# Patient Record
Sex: MALE | Race: WHITE | NOT HISPANIC OR LATINO | URBAN - METROPOLITAN AREA
[De-identification: names, ages, dates, MRNs, and addresses within clinical notes are randomized per-mention and may not be internally consistent; named-entity substitution may affect disease eponyms.]

---

## 2024-02-06 ENCOUNTER — OFFICE VISIT (OUTPATIENT)
Dept: ORTHOPEDIC SURGERY | Facility: CLINIC | Age: 79
End: 2024-02-06
Payer: MEDICARE

## 2024-02-06 ENCOUNTER — APPOINTMENT (OUTPATIENT)
Dept: RADIOLOGY | Facility: CLINIC | Age: 79
End: 2024-02-06
Payer: MEDICARE

## 2024-02-06 ENCOUNTER — HOSPITAL ENCOUNTER (OUTPATIENT)
Dept: RADIOLOGY | Facility: CLINIC | Age: 79
Discharge: HOME | End: 2024-02-06
Payer: MEDICARE

## 2024-02-06 VITALS — HEIGHT: 76 IN | WEIGHT: 260.8 LBS | BODY MASS INDEX: 31.76 KG/M2

## 2024-02-06 DIAGNOSIS — M25.561 ACUTE BILATERAL KNEE PAIN: ICD-10-CM

## 2024-02-06 DIAGNOSIS — M25.562 ACUTE BILATERAL KNEE PAIN: ICD-10-CM

## 2024-02-06 DIAGNOSIS — M17.0 PRIMARY OSTEOARTHRITIS OF BOTH KNEES: Primary | ICD-10-CM

## 2024-02-06 PROCEDURE — 20610 DRAIN/INJ JOINT/BURSA W/O US: CPT | Performed by: ORTHOPAEDIC SURGERY

## 2024-02-06 PROCEDURE — 99214 OFFICE O/P EST MOD 30 MIN: CPT | Performed by: ORTHOPAEDIC SURGERY

## 2024-02-06 PROCEDURE — 1036F TOBACCO NON-USER: CPT | Performed by: ORTHOPAEDIC SURGERY

## 2024-02-06 PROCEDURE — 2500000004 HC RX 250 GENERAL PHARMACY W/ HCPCS (ALT 636 FOR OP/ED): Performed by: ORTHOPAEDIC SURGERY

## 2024-02-06 PROCEDURE — 2500000005 HC RX 250 GENERAL PHARMACY W/O HCPCS: Performed by: ORTHOPAEDIC SURGERY

## 2024-02-06 PROCEDURE — 1125F AMNT PAIN NOTED PAIN PRSNT: CPT | Performed by: ORTHOPAEDIC SURGERY

## 2024-02-06 PROCEDURE — 1159F MED LIST DOCD IN RCRD: CPT | Performed by: ORTHOPAEDIC SURGERY

## 2024-02-06 PROCEDURE — 73560 X-RAY EXAM OF KNEE 1 OR 2: CPT | Mod: 50

## 2024-02-06 RX ORDER — LIDOCAINE HYDROCHLORIDE 20 MG/ML
2 INJECTION, SOLUTION INFILTRATION; PERINEURAL
Status: COMPLETED | OUTPATIENT
Start: 2024-02-06 | End: 2024-02-06

## 2024-02-06 RX ORDER — METHYLPREDNISOLONE ACETATE 40 MG/ML
40 INJECTION, SUSPENSION INTRA-ARTICULAR; INTRALESIONAL; INTRAMUSCULAR; SOFT TISSUE
Status: COMPLETED | OUTPATIENT
Start: 2024-02-06 | End: 2024-02-06

## 2024-02-06 RX ADMIN — METHYLPREDNISOLONE ACETATE 40 MG: 40 INJECTION, SUSPENSION INTRALESIONAL; INTRAMUSCULAR; INTRASYNOVIAL; SOFT TISSUE at 17:53

## 2024-02-06 RX ADMIN — LIDOCAINE HYDROCHLORIDE 2 ML: 20 INJECTION, SOLUTION INFILTRATION; PERINEURAL at 17:53

## 2024-02-06 ASSESSMENT — PAIN DESCRIPTION - DESCRIPTORS: DESCRIPTORS: SHARP

## 2024-02-06 ASSESSMENT — PAIN SCALES - GENERAL: PAINLEVEL_OUTOF10: 5 - MODERATE PAIN

## 2024-02-06 ASSESSMENT — PAIN - FUNCTIONAL ASSESSMENT: PAIN_FUNCTIONAL_ASSESSMENT: 0-10

## 2024-02-06 NOTE — PROGRESS NOTES
Patient presents with bilateral knee arthritis was injected several years ago on the right knee and it worked very well he is developed similar pain in the left knee which is worse now has not had treatment for this  Pain is with activity  No fevers or chills no history of injury   Past medical,family and social histories have been reviewed and are up to date.    All other body systems have been reviewed and are negative for complaint.    Constitutional: Well-developed well-nourished   Eyes: Sclerae anicteric, pupils equal and round  HENT: Normocephalic atraumatic  Cardiovascular: Pulses full, regular rate and rhythm  Respiratory: Breathing not labored, no wheezing  Integumentary: Skin intact, no lesions or rashes  Neurological: Sensation intact, no gross strength deficits, reflexes equal  Psychiatric: Alert oriented and appropriate  Hematologic/lymphatic: No lymphadenopathy  Both knees: Varus deformity slight flexion contractures maximally tender medially no effusions  X-rays show advanced arthritis primarily affecting medial compartment worse radiographically on the right than the left  Assessment bilateral knee arthritis  Discussed natural history of arthritis discussed conservative measures including weight loss exercise  Recommended injections  Discussed indications for knee replacement surgery   L Inj/Asp: bilateral knee on 2/6/2024 5:53 PM  Indications: pain  Details: 21 G needle, lateral approach  Medications (Right): 2 mL lidocaine 20 mg/mL (2 %); 40 mg methylPREDNISolone acetate 40 mg/mL  Medications (Left): 2 mL lidocaine 20 mg/mL (2 %); 40 mg methylPREDNISolone acetate 40 mg/mL  Procedure, treatment alternatives, risks and benefits explained, specific risks discussed. Consent was given by the patient. Immediately prior to procedure a time out was called to verify the correct patient, procedure, equipment, support staff and site/side marked as required. Patient was prepped and draped in the usual sterile  fashion.         Djd both knee left hurts more than right    Injected both  Discussed tka

## 2025-02-18 ENCOUNTER — OFFICE VISIT (OUTPATIENT)
Dept: ORTHOPEDIC SURGERY | Facility: CLINIC | Age: 80
End: 2025-02-18
Payer: MEDICARE

## 2025-02-18 ENCOUNTER — HOSPITAL ENCOUNTER (OUTPATIENT)
Dept: RADIOLOGY | Facility: CLINIC | Age: 80
Discharge: HOME | End: 2025-02-18
Payer: MEDICARE

## 2025-02-18 VITALS — WEIGHT: 256 LBS | HEIGHT: 76 IN | BODY MASS INDEX: 31.17 KG/M2

## 2025-02-18 DIAGNOSIS — M17.0 PRIMARY OSTEOARTHRITIS OF BOTH KNEES: ICD-10-CM

## 2025-02-18 DIAGNOSIS — G89.29 CHRONIC PAIN OF BOTH KNEES: Primary | ICD-10-CM

## 2025-02-18 DIAGNOSIS — M25.561 CHRONIC PAIN OF BOTH KNEES: Primary | ICD-10-CM

## 2025-02-18 DIAGNOSIS — Z01.818 PREOP EXAMINATION: ICD-10-CM

## 2025-02-18 DIAGNOSIS — M25.562 CHRONIC PAIN OF BOTH KNEES: Primary | ICD-10-CM

## 2025-02-18 PROCEDURE — 99214 OFFICE O/P EST MOD 30 MIN: CPT | Mod: 25,57 | Performed by: ORTHOPAEDIC SURGERY

## 2025-02-18 PROCEDURE — 1160F RVW MEDS BY RX/DR IN RCRD: CPT | Performed by: ORTHOPAEDIC SURGERY

## 2025-02-18 PROCEDURE — 1159F MED LIST DOCD IN RCRD: CPT | Performed by: ORTHOPAEDIC SURGERY

## 2025-02-18 PROCEDURE — 99214 OFFICE O/P EST MOD 30 MIN: CPT | Performed by: ORTHOPAEDIC SURGERY

## 2025-02-18 PROCEDURE — 73562 X-RAY EXAM OF KNEE 3: CPT | Mod: 50

## 2025-02-18 PROCEDURE — 2500000004 HC RX 250 GENERAL PHARMACY W/ HCPCS (ALT 636 FOR OP/ED): Performed by: ORTHOPAEDIC SURGERY

## 2025-02-18 PROCEDURE — 1036F TOBACCO NON-USER: CPT | Performed by: ORTHOPAEDIC SURGERY

## 2025-02-18 PROCEDURE — 20610 DRAIN/INJ JOINT/BURSA W/O US: CPT | Mod: LT | Performed by: ORTHOPAEDIC SURGERY

## 2025-02-18 RX ORDER — METHYLPREDNISOLONE ACETATE 40 MG/ML
40 INJECTION, SUSPENSION INTRA-ARTICULAR; INTRALESIONAL; INTRAMUSCULAR; SOFT TISSUE
Status: COMPLETED | OUTPATIENT
Start: 2025-02-18 | End: 2025-02-18

## 2025-02-18 RX ORDER — MELOXICAM 7.5 MG/1
7.5 TABLET ORAL ONCE
OUTPATIENT
Start: 2025-02-18 | End: 2025-02-18

## 2025-02-18 RX ORDER — SCOPOLAMINE 1 MG/3D
1 PATCH, EXTENDED RELEASE TRANSDERMAL
OUTPATIENT
Start: 2025-02-18 | End: 2025-02-21

## 2025-02-18 RX ORDER — LIDOCAINE HYDROCHLORIDE 20 MG/ML
2 INJECTION, SOLUTION INFILTRATION; PERINEURAL
Status: COMPLETED | OUTPATIENT
Start: 2025-02-18 | End: 2025-02-18

## 2025-02-18 RX ORDER — ACETAMINOPHEN 325 MG/1
975 TABLET ORAL ONCE
OUTPATIENT
Start: 2025-02-18 | End: 2025-02-18

## 2025-02-18 RX ORDER — PREGABALIN 25 MG/1
75 CAPSULE ORAL ONCE
OUTPATIENT
Start: 2025-02-18 | End: 2025-02-18

## 2025-02-18 RX ADMIN — LIDOCAINE HYDROCHLORIDE 2 ML: 20 INJECTION, SOLUTION INFILTRATION; PERINEURAL at 16:01

## 2025-02-18 RX ADMIN — METHYLPREDNISOLONE ACETATE 40 MG: 40 INJECTION, SUSPENSION INTRALESIONAL; INTRAMUSCULAR; INTRASYNOVIAL; SOFT TISSUE at 16:01

## 2025-02-18 NOTE — PROGRESS NOTES
Chief Complaint   Chief Complaint   Patient presents with    Right Knee - Pain     Dx surgery    Left Knee - Pain         HPI:      Colby Hunter is a pleasant 79 y.o. year-old male who is seen today for bilateral knee pain right worse than left last injection of the right knee was not helpful the left to become more painful recently he is unable to walk more than 1 block without stopping his treatment has included attempts at weight loss injections and oral anti-inflammatories and a combination of medications    Review of Systems all other body systems have been reviewed and are negative for complaint.    There were no vitals filed for this visit.    History reviewed. No pertinent past medical history.  There is no problem list on file for this patient.      Medication Documentation Review Audit       Reviewed by Crys Mccain MA (Medical Assistant) on 02/18/25 at 1529      Medication Order Taking? Sig Documenting Provider Last Dose Status            No Medications to Display                                   No Known Allergies    Social History     Socioeconomic History    Marital status:      Spouse name: Not on file    Number of children: Not on file    Years of education: Not on file    Highest education level: Not on file   Occupational History    Not on file   Tobacco Use    Smoking status: Never    Smokeless tobacco: Never   Substance and Sexual Activity    Alcohol use: Yes     Alcohol/week: 3.0 standard drinks of alcohol     Types: 1 Glasses of wine, 1 Cans of beer, 1 Shots of liquor per week    Drug use: Never    Sexual activity: Not on file   Other Topics Concern    Not on file   Social History Narrative    Not on file     Social Drivers of Health     Financial Resource Strain: Not on file   Food Insecurity: Not on file   Transportation Needs: Not on file   Physical Activity: Not on file   Stress: Not on file   Social Connections: Not on file   Intimate Partner Violence: Not on file   Housing  Stability: Not on file       History reviewed. No pertinent surgical history.    Body mass index is 31.16 kg/m².    HgA1c:   Lab Results   Component Value Date    HGBA1C 5.1 08/21/2024    [id number] 100 08/21/2024       Physical Exam:  Constitutional: Well-developed well-nourished   Eyes: Sclerae anicteric, pupils equal and round  HENT: Normocephalic atraumatic  Cardiovascular: Pulses full, regular rate and rhythm  Respiratory: Breathing not labored, no wheezing  Integumentary: Skin intact, no lesions or rashes  Neurological: Sensation intact, no gross strength deficits, reflexes equal  Psychiatric: Alert oriented and appropriate  Hematologic/lymphatic: No lymphadenopathy  Right knee: Varus deformity maximally tender medially slight flexion contracture no effusion generalized joint line tenderness and crepitus range of motion 5-125  Left knee slight varus deformity maximally tender medially small effusion range of motion preserved          Imaging:  X-rays are mislabeled but the right one shows advanced arthritis bone-on-bone KL stage IV left knee shows medial joint space narrowing varus deformity        Impression/Plan:  End-stage right knee arthritis  This patient has failed conservative therapy for knee arthritis.  This has included activity modification, attempts at weight loss, antiinflammatory medication, injectables, and physical therapy.  Pain is severely affecting activities of daily living and present at rest as well.    I have recommended proceeding with total knee replacement.  We discussed the potential risks including but not limited to persistent pain, DVT, infection, stiffness, periprosthetic fracture, and loosening.  Additionally we have discussed potential prolonged rehabilitation of several months.  We have also discussed preoperative medical screening and joint class.  The plan is for the procedure to be done as an outpatient.  The patient has been instructed that they need to make appropriate  preparations for assistance at home in the immediate postoperative period  Moderately advanced left knee arthritis injected knee today  L Inj/Asp: L knee on 2/18/2025 4:01 PM  Indications: pain  Details: 21 G needle, anteromedial approach  Medications: 40 mg methylPREDNISolone acetate 40 mg/mL; 2 mL lidocaine 20 mg/mL (2 %)

## 2025-02-20 ENCOUNTER — TELEPHONE (OUTPATIENT)
Dept: ORTHOPEDIC SURGERY | Facility: CLINIC | Age: 80
End: 2025-02-20
Payer: MEDICARE

## 2025-02-20 DIAGNOSIS — M17.0 PRIMARY OSTEOARTHRITIS OF BOTH KNEES: Primary | ICD-10-CM

## 2025-04-17 ENCOUNTER — APPOINTMENT (OUTPATIENT)
Dept: ORTHOPEDIC SURGERY | Facility: CLINIC | Age: 80
End: 2025-04-17
Payer: MEDICARE

## 2025-05-19 ENCOUNTER — CLINICAL SUPPORT (OUTPATIENT)
Dept: PREADMISSION TESTING | Facility: HOSPITAL | Age: 80
End: 2025-05-19
Payer: MEDICARE

## 2025-05-19 DIAGNOSIS — M17.0 PRIMARY OSTEOARTHRITIS OF BOTH KNEES: ICD-10-CM

## 2025-05-19 RX ORDER — GLUCOSAMINE/CHONDR SU A SOD 167-133 MG
250 CAPSULE ORAL
COMMUNITY
End: 2025-06-02 | Stop reason: WASHOUT

## 2025-05-19 RX ORDER — GABAPENTIN 100 MG/1
100 CAPSULE ORAL 3 TIMES DAILY
COMMUNITY
Start: 2025-02-24 | End: 2025-06-02

## 2025-05-19 RX ORDER — FLUTICASONE PROPIONATE 50 MCG
1 SPRAY, SUSPENSION (ML) NASAL DAILY
COMMUNITY
Start: 2024-11-04

## 2025-05-19 RX ORDER — UBIDECARENONE 30 MG
30 CAPSULE ORAL
COMMUNITY

## 2025-05-19 RX ORDER — ATORVASTATIN CALCIUM 20 MG/1
20 TABLET, FILM COATED ORAL
COMMUNITY
Start: 2025-03-04

## 2025-05-19 RX ORDER — ENALAPRIL MALEATE 20 MG/1
20 TABLET ORAL 2 TIMES DAILY
COMMUNITY
Start: 2025-03-04

## 2025-05-19 RX ORDER — SPIRONOLACTONE AND HYDROCHLOROTHIAZIDE 25; 25 MG/1; MG/1
0.5 TABLET ORAL
COMMUNITY
Start: 2025-03-04

## 2025-05-19 RX ORDER — ERGOCALCIFEROL (VITAMIN D2) 50 MCG
2000 CAPSULE ORAL DAILY
COMMUNITY

## 2025-05-19 RX ORDER — DOXYCYCLINE HYCLATE 20 MG
20 TABLET ORAL 2 TIMES DAILY
COMMUNITY
End: 2025-06-02 | Stop reason: WASHOUT

## 2025-05-19 RX ORDER — THIAMINE HCL 50 MG
50 TABLET ORAL DAILY
COMMUNITY
End: 2025-06-02 | Stop reason: WASHOUT

## 2025-05-19 RX ORDER — TAMSULOSIN HYDROCHLORIDE 0.4 MG/1
0.4 CAPSULE ORAL DAILY
COMMUNITY

## 2025-05-19 RX ORDER — NAPROXEN SODIUM 220 MG/1
1 TABLET ORAL DAILY
COMMUNITY

## 2025-05-19 RX ORDER — DIGOXIN 250 MCG
250 TABLET ORAL DAILY
COMMUNITY
End: 2025-06-02 | Stop reason: WASHOUT

## 2025-05-19 RX ORDER — METOPROLOL SUCCINATE 100 MG/1
50 TABLET, EXTENDED RELEASE ORAL
COMMUNITY
Start: 2024-02-26

## 2025-05-19 RX ORDER — MULTIVIT-MIN/IRON/FOLIC ACID/K 18-600-40
CAPSULE ORAL DAILY
COMMUNITY

## 2025-05-19 RX ORDER — AMLODIPINE BESYLATE 10 MG/1
10 TABLET ORAL
COMMUNITY
Start: 2025-03-04

## 2025-05-22 ENCOUNTER — EDUCATION (OUTPATIENT)
Dept: ORTHOPEDIC SURGERY | Facility: CLINIC | Age: 80
End: 2025-05-22
Payer: MEDICARE

## 2025-05-22 NOTE — GROUP NOTE
In addition to the group class activities, Colby Hunter had the following lab work completed:  No orders of the defined types were placed in this encounter.    Colby Hunter  attended joint class on 5/22 and Brought a care partner to the class. The preop survey was completed and the patient provided attestation that they understand the content reviewed and that they do have a care partner identified to assist with recovery. Patient was provided with a folder of materials and instructed to call orthopedic navigator with questions.   A new History and Physical was not completed.    This class lasted approximately 2 hours and had 14 participants.   Thank you for attending our Joint Replacement class today in preparation for your upcoming surgery.  Topics discussed include:    MyChart Enrollment  Communication with Care Team  My Chart is the best form of communication to reach all of your caregivers  You can send messages to specific care givers, or a care team  Continued Education  You will be enrolled in a Total Joint Replacement care plan to receive additional education before and after surgery  You can review a short recording of the class content  Access to Medical Records  You can access test results, office notes, appointments, etc.  You can connect to other healthcare systems who use [employer] ([facility], [facility], [facility], etc.)  [id number]  Program Information  Using Meds to Beds is our standard process for joint replacements at [facility] to minimize issues with homegoing medications. Please let us know ahead of time if there is a reason why Meds to Beds cannot be used    Background/Understanding of Joint Replacement Surgery  Potential for same day discharge  Any questions or concerns about your specific surgery/plan are to be directed to the surgeon's office    How to Prepare for Surgery  Use of Nicotine Products/Smoking  Stop several weeks before surgery  Such products slow down the healing process and increase  risk of post-op infection and complications  Clearance for Surgery  Medical Clearance by Specialists  Dental Clearance  Cracked/Broken/Loose teeth left untreated may postpone surgery  The importance of post-op antibiotics for dental visits per surgeon protocol  Preadmission Testing  **Potential for postponed surgery if appropriate clearance is not obtained  Medication Instruction  Follow instructions provided by the doctor who prescribes your medication (typically, but not limited to cardiologist)  Preadmission testing will provide additional instructions during your appointment on what to stop and what to take as you get closer to surgery  For clarification of these instructions, please call preadmission testing directly - [phone]  Tips for Preparing the home for discharge from the hospital  Care Partner  Requirement for surgery, the patient must have a plan to have help at home  Potential for postponed surgery if plan for home support cannot be established  How the care partner can help after surgery  CHG Body Wash/Mouth Wash  Follow the instructions given at preadmission testing  Body wash is to be used on the body and hair for 5 washes  Mouthwash is to be used the night before and morning of surgery  **This is a system-wide protocol developed by infectious disease professionals, we will not alter our recommendations for those with sensitive skin or those who have special hair needs.  Please follow the instructions as they are written as this will provide the best infection prevention measures for surgery.  Should you have an allergy to one of the products, please discuss with your preadmission team**    What to Expect in the Hospital/At Home  Morning of Surgery NPO Guidelines  Nothing to eat after midnight  Water can be consumed up to 2 hours prior to arrival  Surgical and Post-Surgical Care Team  Surgical Team  Anesthesia Team  Nursing  Physical Therapy  Care Coordinating  Pharmacy  Hospital Arrival  Instructions  Arrive at the time provided to you  Consider traffic patterns (rush-hour) based on arrival time  Have arrangements made for a ride home  If discharging same day, care partner should remain at the hospital  Recovering after Surgery  Recovery Room - Visitors are not brought back  Transition to hospital room - 2nd Floor, Visitors will be directed to your room  The presence of and strategies for controlling surgical pain and swelling  The importance of early mobility  Side effects after surgery  What to expect if staying overnight    Discharge Planning  The intended plan for discharge will be for patients to discharge home  All patients require a care partner (family, friend, neighbor, etc.) to stay with the patient for the first few nights after surgery  The inability to secure help at home will postpone surgery  Home Care Services set up per surgeon order  Physical Therapy  Occupational Therapy  **If desired, private duty care can be arranged by the patient ahead of time**  Outpatient Physical Therapy per surgeon order    Recovering at Home  Wound Care  Follow wound care instructions found in your discharge paperwork  Bandage is water-resistant and you may shower with the bandage  Do not scrub directly over the bandage  Do not submerge in water until cleared (bathtub, hot tub, pool, etc.)    Post-Op Risk Prevention  Infection Prevention  Promptly seek treatment for any infections post-operatively  Routine dental visits must be postponed for 3 months after surgery  Your surgeon may require antibiotics prior to future dental visits  Any concerns for infection not related directly to the knee or the hip should be managed by your primary care provider  Blood Clots  Be sure to complete the course of blood thinning medication as prescribed by your surgeon  Movement every 1-2 hours during the day is encouraged to prevent blood clots  Monitor for signs of blood clots  Wear compression stockings as prescribed by  your surgeon  Constipation  Constipation is common following surgery  Drink plenty of fluids  Take stool softener/laxative as prescribed by your surgeon  Move around frequently  Eat foods high in fiber  Fall Prevention  Prepare home ahead of time to clear space to move with walker  Remove throw rugs and electrical cords from walkways  Install railings near any stairways with more than 2 steps  Use night lights for increased visibility at night  Continue to use your assistive device until cleared by surgeon or physical therapy  Dislocation Prevention - Not all procedures will have dislocation precautions  Follow dislocation precautions provided by your surgeon  It is OK to resume sexual activity about 6 weeks following surgery  Be sure to follow any dislocation precautions assigned    Durable Medical Equipment  Cold Therapy  Breg Cold Therapy Machines  Ice/Gel Packs  Assistive Devices  Folding Walker with Wheels (in the front only)  No Rollators  Crutches if approved by Physical Therapy and Surgeon after surgery  Hip Kits  Raised Toilet Seats  Additional Compression Stockings    Joint Preservation  Healthy Activities when Cleared  Walking  Swimming  Bike Riding  Activities to Avoid  Refrain from repetitive motions which have a high impact on the joint  Gradual Progression  Progress activity slowly, listen to your body  Common Findings - NORMAL after surgery  Clicking/Grinding  Numbness near incision    Physical Therapy  Prehabilitation exercises  START TODAY ON BOTH LEGS  Surgery Specific Precautions  Follow surgery specific precautions found in your discharge paperwork    Follow-Up Visit  All patients will see their surgeon for a follow up visit after surgery  The visit may range from 2-6 weeks after surgery and is surgeon specific      Please don't hesitate to reach out if you have any additional questions or concerns.    Rajiv Matt BSN, RN  Alice Rosa BSN, RN-BC  Orthopedic Nurses  [facility]    [phone]

## 2025-05-31 NOTE — CPM/PAT NURSE NOTE
CPM/PAT Nurse Note      Name: Colby Hunter (Colby Hunter)  /Age: 1945/79 y.o.       Medical History[1]    Surgical History[2]    Patient Sexual activity questions deferred to the physician.    Family History[3]    Allergies[4]    Prior to Admission medications    Medication Sig Start Date End Date Taking? Authorizing Provider   amLODIPine (Norvasc) 10 mg tablet Take 1 tablet (10 mg) by mouth once daily. 3/4/25   Historical Provider, MD   ascorbic acid, vitamin C, 500 mg capsule Take by mouth once daily.    Historical Provider, MD   atorvastatin (Lipitor) 20 mg tablet Take 1 tablet (20 mg) by mouth once daily. 3/4/25   Historical Provider, MD   co-enzyme Q-10 30 mg capsule Take 1 capsule (30 mg) by mouth once daily.    Historical Provider, MD   digoxin (Lanoxin) 250 MCG tab;et Take 1 tablet (250 mcg) by mouth once daily.    Historical Provider, MD   doxycycline (Periostat) 20 mg tablet Take 1 tablet (20 mg) by mouth 2 times a day.    Historical Provider, MD   enalapril (Vasotec) 20 mg tablet Take 1 tablet (20 mg) by mouth twice a day. 3/4/25   Historical Provider, MD   ergocalciferol (Vitamin D-2) 50 MCG (2000 UT) capsule capsule Take 1 capsule (50 mcg) by mouth once daily.    Historical Provider, MD   fluticasone (Flonase) 50 mcg/actuation nasal spray 1 spray by Does not apply route once daily. 24   Historical Provider, MD   gabapentin (Neurontin) 100 mg capsule Take 1 capsule (100 mg) by mouth 3 times a day. 25  Historical Provider, MD   metoprolol succinate XL (Toprol-XL) 100 mg 24 hr tablet Take 0.5 tablets (50 mg) by mouth once daily. 24   Historical Provider, MD   niacin 500 mg tablet Take 0.5 tablets (250 mg) by mouth once daily with breakfast.    Historical Provider, MD   omega 3-dha-epa-fish oil (Fish OiL) 1,200 (144-216) mg capsule Take 1 capsule (1,200 mg) by mouth once daily.    Historical Provider, MD   spironolacton-hydrochlorothiaz (Aldactazide) 25-25 mg tablet  Take 0.5 tablets (12.5 mg) by mouth once daily. 3/4/25   Historical Provider, MD   tamsulosin (Flomax) 0.4 mg 24 hr capsule Take 1 capsule (0.4 mg) by mouth once daily.    Historical Provider, MD   thiamine (Vitamin B-1) 50 mg tablet Take 1 tablet (50 mg) by mouth once daily.    Historical Provider, MD MARINA NOLAN     DASI Risk Score    No data to display       Caprini DVT Assessment    No data to display       Modified Frailty Index    No data to display       [id number] Stroke Risk Points  Current as of just now        5 0 to 9 Points:      Last Change:           The [id number]-[id number] risk score (Lip DANNY, et al. 2009. © 2010 American College [employer] Chest Physicians) quantifies the risk of stroke for a patient with atrial fibrillation. For patients without atrial fibrillation or under the age of 18 this score appears as N/A. Higher score values generally indicate higher risk of stroke.          Points Metrics   0 Has Congestive Heart Failure:  No     Patients with congestive heart failure get 1 point.    Current as of just now   1 Has Hypertension:  Yes      Patients with hypertension get 1 point.    Current as of just now   2 Age:  79     Patients 65 to 74 years old get 1 point, or patients 75 years and older get 2 points.    Current as of just now   0 Has Diabetes:  No     Patients with diabetes get 1 point.    Current as of just now   2 Had Stroke:  No  Had TIA:  Yes  Had Thromboembolism:  No     Patients who have had a stroke, TIA, or thromboembolism get 2 points.    Current as of just now   0 Has Vascular Disease:  No     Patients with vascular disease get 1 point.    Current as of just now   0 Clinically Relevant Sex:  Male     Patients with a clinically relevant sex of Female get 1 point.    Current as of just now             Revised Cardiac Risk Index    No data to display       Apfel Simplified Score    No data to display       Risk Analysis Index Results This Encounter    No data found in the last 10  encounters.       Prodigy: High Risk  Total Score: 20              Prodigy Age Score      Prodigy Gender Score          ARISCAT Score for Postoperative Pulmonary Complications    No data to display       Yelitza Perioperative Risk for Myocardial Infarction or Cardiac Arrest (FARHANA)    No data to display         Nurse Plan of Action: RN screening call complete.  Reviewed allergies, medications and pharmacy, medical, surgical and social history with patient.  Chart updated.                 [1]   Past Medical History:  Diagnosis Date    Afib (Multi) 05/1997    not on AC d/t SDH    Cardiology follow-up encounter     Dr. Desai    Encounter for pre-operative cardiovascular clearance     scanned to media    Hyperlipidemia     Hypertension     Idiopathic peripheral neuropathy     Obesity     CAROLA on CPAP     Primary osteoarthritis of both knees     Plan: Right Knee Total Arthroplasty 6/9/25    Subdural hematoma (Multi)     9/2011 and 10/2011    TIA (transient ischemic attack) 02/2014    Vitamin D deficiency    [2]   Past Surgical History:  Procedure Laterality Date    LORENZO HOLE FOR SUBDURAL HEMATOMA Bilateral 09/05/2011    ORIF FOREARM FRACTURE Right    [3]   Family History  Problem Relation Name Age of Onset    Hypertension Mother      Hyperlipidemia Mother      Pancreatic cancer Father      Hypertension Father      Hyperlipidemia Father      Diabetes Father      Heart attack Sister  50    Hypertension Sister      Hyperlipidemia Sister     [4] No Known Allergies

## 2025-06-02 ENCOUNTER — LAB (OUTPATIENT)
Dept: LAB | Facility: HOSPITAL | Age: 80
End: 2025-06-02
Payer: MEDICARE

## 2025-06-02 ENCOUNTER — HOSPITAL ENCOUNTER (OUTPATIENT)
Dept: CARDIOLOGY | Facility: HOSPITAL | Age: 80
Discharge: HOME | End: 2025-06-02
Payer: MEDICARE

## 2025-06-02 ENCOUNTER — PRE-ADMISSION TESTING (OUTPATIENT)
Dept: PREADMISSION TESTING | Facility: HOSPITAL | Age: 80
End: 2025-06-02
Payer: MEDICARE

## 2025-06-02 ENCOUNTER — HOSPITAL ENCOUNTER (OUTPATIENT)
Dept: RADIOLOGY | Facility: CLINIC | Age: 80
Discharge: HOME | End: 2025-06-02
Payer: MEDICARE

## 2025-06-02 VITALS
HEART RATE: 71 BPM | DIASTOLIC BLOOD PRESSURE: 77 MMHG | HEIGHT: 76 IN | TEMPERATURE: 98.4 F | OXYGEN SATURATION: 98 % | SYSTOLIC BLOOD PRESSURE: 114 MMHG | BODY MASS INDEX: 31.95 KG/M2 | RESPIRATION RATE: 18 BRPM | WEIGHT: 262.35 LBS

## 2025-06-02 DIAGNOSIS — M17.0 PRIMARY OSTEOARTHRITIS OF BOTH KNEES: ICD-10-CM

## 2025-06-02 DIAGNOSIS — I10 ESSENTIAL (PRIMARY) HYPERTENSION: Primary | ICD-10-CM

## 2025-06-02 DIAGNOSIS — Z01.818 PREPROCEDURAL EXAMINATION: ICD-10-CM

## 2025-06-02 DIAGNOSIS — I10 HYPERTENSION, UNSPECIFIED TYPE: ICD-10-CM

## 2025-06-02 DIAGNOSIS — R79.9 ABNORMAL BLOOD CHEMISTRY LEVEL: ICD-10-CM

## 2025-06-02 DIAGNOSIS — I10 HYPERTENSION, UNSPECIFIED TYPE: Primary | ICD-10-CM

## 2025-06-02 DIAGNOSIS — R79.9 ABNORMAL FINDING OF BLOOD CHEMISTRY, UNSPECIFIED: ICD-10-CM

## 2025-06-02 LAB
ANION GAP SERPL CALC-SCNC: 15 MMOL/L (ref 10–20)
ATRIAL RATE: 136 BPM
BASOPHILS # BLD AUTO: 0.1 X10*3/UL (ref 0–0.1)
BASOPHILS NFR BLD AUTO: 1.6 %
BUN SERPL-MCNC: 22 MG/DL (ref 6–23)
CALCIUM SERPL-MCNC: 9.3 MG/DL (ref 8.6–10.3)
CHLORIDE SERPL-SCNC: 107 MMOL/L (ref 98–107)
CO2 SERPL-SCNC: 24 MMOL/L (ref 21–32)
CREAT SERPL-MCNC: 0.97 MG/DL (ref 0.5–1.3)
EGFRCR SERPLBLD CKD-EPI 2021: 80 ML/MIN/1.73M*2
EOSINOPHIL # BLD AUTO: 0.29 X10*3/UL (ref 0–0.4)
EOSINOPHIL NFR BLD AUTO: 4.6 %
ERYTHROCYTE [DISTWIDTH] IN BLOOD BY AUTOMATED COUNT: 12.9 % (ref 11.5–14.5)
EST. AVERAGE GLUCOSE BLD GHB EST-MCNC: 103 MG/DL
GLUCOSE SERPL-MCNC: 87 MG/DL (ref 74–99)
HBA1C MFR BLD: 5.2 % (ref ?–5.7)
HCT VFR BLD AUTO: 41.2 % (ref 41–52)
HGB BLD-MCNC: 13.6 G/DL (ref 13.5–17.5)
IMM GRANULOCYTES # BLD AUTO: 0.02 X10*3/UL (ref 0–0.5)
IMM GRANULOCYTES NFR BLD AUTO: 0.3 % (ref 0–0.9)
LYMPHOCYTES # BLD AUTO: 1.67 X10*3/UL (ref 0.8–3)
LYMPHOCYTES NFR BLD AUTO: 26.7 %
MCH RBC QN AUTO: 32.3 PG (ref 26–34)
MCHC RBC AUTO-ENTMCNC: 33 G/DL (ref 32–36)
MCV RBC AUTO: 98 FL (ref 80–100)
MONOCYTES # BLD AUTO: 0.71 X10*3/UL (ref 0.05–0.8)
MONOCYTES NFR BLD AUTO: 11.3 %
NEUTROPHILS # BLD AUTO: 3.47 X10*3/UL (ref 1.6–5.5)
NEUTROPHILS NFR BLD AUTO: 55.5 %
NRBC BLD-RTO: 0 /100 WBCS (ref 0–0)
PLATELET # BLD AUTO: 194 X10*3/UL (ref 150–450)
POTASSIUM SERPL-SCNC: 4.7 MMOL/L (ref 3.5–5.3)
Q ONSET: 220 MS
QRS COUNT: 11 BEATS
QRS DURATION: 100 MS
QT INTERVAL: 442 MS
QTC CALCULATION(BAZETT): 480 MS
QTC FREDERICIA: 467 MS
R AXIS: 21 DEGREES
RBC # BLD AUTO: 4.21 X10*6/UL (ref 4.5–5.9)
SODIUM SERPL-SCNC: 141 MMOL/L (ref 136–145)
T AXIS: 39 DEGREES
T OFFSET: 441 MS
VENTRICULAR RATE: 71 BPM
WBC # BLD AUTO: 6.3 X10*3/UL (ref 4.4–11.3)

## 2025-06-02 PROCEDURE — 99204 OFFICE O/P NEW MOD 45 MIN: CPT | Performed by: NURSE PRACTITIONER

## 2025-06-02 PROCEDURE — 87081 CULTURE SCREEN ONLY: CPT | Mod: AHULAB | Performed by: NURSE PRACTITIONER

## 2025-06-02 PROCEDURE — 36415 COLL VENOUS BLD VENIPUNCTURE: CPT

## 2025-06-02 PROCEDURE — 93005 ELECTROCARDIOGRAM TRACING: CPT

## 2025-06-02 PROCEDURE — 73562 X-RAY EXAM OF KNEE 3: CPT | Mod: RT

## 2025-06-02 PROCEDURE — 80048 BASIC METABOLIC PNL TOTAL CA: CPT

## 2025-06-02 PROCEDURE — 93010 ELECTROCARDIOGRAM REPORT: CPT | Performed by: INTERNAL MEDICINE

## 2025-06-02 PROCEDURE — 83036 HEMOGLOBIN GLYCOSYLATED A1C: CPT

## 2025-06-02 PROCEDURE — 73562 X-RAY EXAM OF KNEE 3: CPT | Mod: RIGHT SIDE | Performed by: RADIOLOGY

## 2025-06-02 PROCEDURE — 85025 COMPLETE CBC W/AUTO DIFF WBC: CPT

## 2025-06-02 RX ORDER — CHLORHEXIDINE GLUCONATE ORAL RINSE 1.2 MG/ML
15 SOLUTION DENTAL DAILY
Qty: 30 ML | Refills: 0 | Status: SHIPPED | OUTPATIENT
Start: 2025-06-02 | End: 2025-06-04

## 2025-06-02 ASSESSMENT — ENCOUNTER SYMPTOMS
CONSTITUTIONAL NEGATIVE: 1
CARDIOVASCULAR NEGATIVE: 1
ARTHRALGIAS: 1
GASTROINTESTINAL NEGATIVE: 1
NECK NEGATIVE: 1
RESPIRATORY NEGATIVE: 1

## 2025-06-02 NOTE — CPM/PAT H&P
CPM/PAT Evaluation       Name: Colby Hunter (Colby Hunter)  /Age: 1945/79 y.o.     SURGEON :DR LEONEL RAMÍREZ    Surgery, Date, and Length:  Right Knee Total Arthroplasty 25  HPI:  This a 79 y.o. male who presents for presurgical evaluation for for above mentioned procedure.Pt reports bilateral knee pain . He has tried injections and anti inflammatories without benefit.  . After discussion of the risks and benefits with Dr. RAMÍREZ the patient elects to proceed with the planned procedure.       Past Medical History:   Diagnosis Date    Afib (Multi) 1997    not on AC d/t SDH    Cardiology follow-up encounter     Dr. Desai    Encounter for pre-operative cardiovascular clearance     scanned to media    Hyperlipidemia     Hypertension     Idiopathic peripheral neuropathy     Obesity     CAROLA on CPAP     Primary osteoarthritis of both knees     Plan: Right Knee Total Arthroplasty 25    Subdural hematoma (Multi)     2011 and 10/2011    TIA (transient ischemic attack) 2014    Vitamin D deficiency        Past Surgical History:   Procedure Laterality Date    LORENZO HOLE FOR SUBDURAL HEMATOMA Bilateral 2011    ORIF FOREARM FRACTURE Right        Anesthesia History  Pt denies any past history of anesthetic complications such as PONV, awareness, prolonged sedation, dental damage, aspiration, cardiac arrest, difficult intubation, difficult I.V. access or unexpected hospital admissions.  NO malignant hyperthermia and or pseudo cholinesterase deficiency.    The patient is not  a Mu-ism and will accept blood and blood products if medically indicated.   No history of blood transfusions .Type and screen not sent.     Social History  Social History     Substance and Sexual Activity   Drug Use Never      Social History     Substance and Sexual Activity   Alcohol Use Not Currently    Alcohol/week: 0.0 - 3.0 standard drinks of alcohol      Social History     Tobacco Use   Smoking Status Never    Smokeless Tobacco Never          Family History   Problem Relation Name Age of Onset    Hypertension Mother      Hyperlipidemia Mother      Pancreatic cancer Father      Hypertension Father      Hyperlipidemia Father      Diabetes Father      Heart attack Sister  50    Hypertension Sister      Hyperlipidemia Sister         Allergies   Allergen Reactions    Warfarin Bleeding     P TON WARFARIN FOR AFIB HAD SPONTANEOUS SDH HAS BEEN ADVISED TO AVOID        Prior to Admission medications    Medication Sig Start Date End Date Taking? Authorizing Provider   amLODIPine (Norvasc) 10 mg tablet Take 1 tablet (10 mg) by mouth once daily. 3/4/25   Historical Provider, MD   ascorbic acid, vitamin C, 500 mg capsule Take by mouth once daily.    Historical Provider, MD   atorvastatin (Lipitor) 20 mg tablet Take 1 tablet (20 mg) by mouth once daily. 3/4/25   Historical Provider, MD   chlorhexidine (Peridex) 0.12 % solution Use 15 mL in the mouth or throat once daily for 2 days. 6/2/25 6/4/25  GRACE Payne-CNP   co-enzyme Q-10 30 mg capsule Take 1 capsule (30 mg) by mouth once daily.    Historical Provider, MD   digoxin (Lanoxin) 250 MCG tab;et Take 1 tablet (250 mcg) by mouth once daily.    Historical Provider, MD   doxycycline (Periostat) 20 mg tablet Take 1 tablet (20 mg) by mouth 2 times a day.    Historical Provider, MD   enalapril (Vasotec) 20 mg tablet Take 1 tablet (20 mg) by mouth twice a day. 3/4/25   Historical Provider, MD   ergocalciferol (Vitamin D-2) 50 MCG (2000 UT) capsule capsule Take 1 capsule (50 mcg) by mouth once daily.    Historical Provider, MD   fluticasone (Flonase) 50 mcg/actuation nasal spray 1 spray by Does not apply route once daily. 11/4/24   Historical Provider, MD   gabapentin (Neurontin) 100 mg capsule Take 1 capsule (100 mg) by mouth 3 times a day. 2/24/25 5/25/25  Historical Provider, MD   metoprolol succinate XL (Toprol-XL) 100 mg 24 hr tablet Take 0.5 tablets (50 mg) by mouth once  daily. 2/26/24   Historical Provider, MD   niacin 500 mg tablet Take 0.5 tablets (250 mg) by mouth once daily with breakfast.    Historical Provider, MD   omega 3-dha-epa-fish oil (Fish OiL) 1,200 (144-216) mg capsule Take 1 capsule (1,200 mg) by mouth once daily.    Historical Provider, MD   spironolacton-hydrochlorothiaz (Aldactazide) 25-25 mg tablet Take 0.5 tablets (12.5 mg) by mouth once daily. 3/4/25   Historical Provider, MD   tamsulosin (Flomax) 0.4 mg 24 hr capsule Take 1 capsule (0.4 mg) by mouth once daily.    Historical Provider, MD   thiamine (Vitamin B-1) 50 mg tablet Take 1 tablet (50 mg) by mouth once daily.    Historical Provider, MD        PAT ROS:   Constitutional:   neg    Neuro/Psych:   Eyes:   Ears:   Nose:   neg    Mouth:   neg    Throat:   neg    Neck:   neg    Cardio:   neg    Respiratory:   neg    Endocrine:   GI:   neg    :   neg    Musculoskeletal:    arthralgias (LEFT KNEE)  Hematologic:   neg    Skin:      Physical Exam  Vitals reviewed.   Constitutional:       Appearance: He is obese.   HENT:      Head: Normocephalic.      Mouth/Throat:      Mouth: Mucous membranes are moist.   Eyes:      Extraocular Movements: Extraocular movements intact.      Pupils: Pupils are equal, round, and reactive to light.   Cardiovascular:      Rate and Rhythm: Normal rate and regular rhythm.      Pulses: Normal pulses.      Heart sounds: Normal heart sounds.   Pulmonary:      Effort: Pulmonary effort is normal.      Breath sounds: Normal breath sounds.   Musculoskeletal:         General: Tenderness (b/l knees) present.      Cervical back: Normal range of motion.   Skin:     General: Skin is warm and dry.   Neurological:      Mental Status: He is alert and oriented to person, place, and time.   Psychiatric:         Behavior: Behavior normal.          PAT AIRWAY:   Airway:     Mallampati::  II  normal        Testing/Diagnostic:   EKG IN EPIC   Lab Results   Component Value Date    WBC 6.3 06/02/2025    HGB  "13.6 06/02/2025    HCT 41.2 06/02/2025    MCV 98 06/02/2025     06/02/2025     Lab Results   Component Value Date    GLUCOSE 87 06/02/2025    CALCIUM 9.3 06/02/2025     06/02/2025    K 4.7 06/02/2025    CO2 24 06/02/2025     06/02/2025    BUN 22 06/02/2025    CREATININE 0.97 06/02/2025     Lab Results   Component Value Date    HGBA1C 5.2 06/02/2025         Patient Specialist/PCP:     /77   Pulse 71   Temp 36.9 °C (98.4 °F) (Temporal)   Resp 18   Ht 1.93 m (6' 4\")   Wt 119 kg (262 lb 5.6 oz)   SpO2 98%   BMI 31.93 kg/m²       ASSESSMENT/PLAN    Patient is a 79year-old  scheduled for Right Knee Total Arthroplasty  with Dr. Hannah   on  6/9/25 .  CARDIOVASCULAR:  RCRI score / Risk: The patients score is 1 based on history . Per ACC/AHA guidelines this places him  at  6.0 % risk for MACE undergoing a intermediate  risk procedure . The patient has the following risk factors: hx TIA   Functional Capacity: The patients exercise tolerance is  4  METS. This is based on the patient's limited only due to knee pain . Patient denies  active cardiac symptoms or anginal equivalents .      CARDIAC CLEARANCE :  We have received cardiac risk stratification from the patient's cardiologist on 2/27/25.Dr. BARAJAS has assessed him as a intermediate  risk .    PULMONARY:  The patient has the following factors that place them at increased risk of perioperative pulmonary complications;age greater than 65/BMI greater than 27/deepthi/greater than 2.5 hour procedure.  Postoperatively the patient would benefit from early pulmonary toilet/incentive spirometry q 1-2 hours while awake/pulse oximetry/cautious use of respiratory depressant medications such as opioids/elevate the HOB/oral hygiene.    BPH :  Pt is on (alpha 5- reductase inhibitors) Recommendations: continue throughout perioperative period, monitior for urinary retention, avoid pelayo catheter if able..    Chronic AFIB/ NO ANTICOAGULATION :  Pt was on " warfarin for AFIb . He developed a sponataneous subdural hematoma  requiring [place] hole evacuation .   Pt is not on any antiplatelet or anticoagulation therapy .  Pt at increase risk for DVT.  DVT:  CAPRINI SCORE=11  The patient has the following factors that increase HIS  Risk for thrombus formation ; Virchow's triad , AGE 79, BMI 31, TJR , hx TIA , Surgical procedure >2 hrs  procedure .    Recommendations: DVT prophylaxis  per Dr RAMÍREZ protocol . SCD's, CESAR's, and early ambulation are recommended. Heparin or LMWH is recommended for the very high risk .      Risk assessment complete.  Patient is scheduled for  intermediate  surgical risk procedure.  Patient is considered an increased  risk to proceed with the planned procedure.      Preoperative medication instructions were provided and reviewed with the patient.  Any additional testing or evaluation was explained to the patient.  Nothing by mouth instructions were discussed and patient's questions were answered prior to conclusion to this encounter.  Patient verbalized understanding of preoperative instructions given in preadmission testing; discharge instructions available in EMR.

## 2025-06-02 NOTE — H&P (VIEW-ONLY)
CPM/PAT Evaluation       Name: Colby Hunter (Colby Hunter)  /Age: 1945/79 y.o.     SURGEON :DR LEONEL RAMÍREZ    Surgery, Date, and Length:  Right Knee Total Arthroplasty 25  HPI:  This a 79 y.o. male who presents for presurgical evaluation for for above mentioned procedure.Pt reports bilateral knee pain . He has tried injections and anti inflammatories without benefit.  . After discussion of the risks and benefits with Dr. RAMÍREZ the patient elects to proceed with the planned procedure.       Past Medical History:   Diagnosis Date    Afib (Multi) 1997    not on AC d/t SDH    Cardiology follow-up encounter     Dr. Desai    Encounter for pre-operative cardiovascular clearance     scanned to media    Hyperlipidemia     Hypertension     Idiopathic peripheral neuropathy     Obesity     CAROLA on CPAP     Primary osteoarthritis of both knees     Plan: Right Knee Total Arthroplasty 25    Subdural hematoma (Multi)     2011 and 10/2011    TIA (transient ischemic attack) 2014    Vitamin D deficiency        Past Surgical History:   Procedure Laterality Date    LORENZO HOLE FOR SUBDURAL HEMATOMA Bilateral 2011    ORIF FOREARM FRACTURE Right        Anesthesia History  Pt denies any past history of anesthetic complications such as PONV, awareness, prolonged sedation, dental damage, aspiration, cardiac arrest, difficult intubation, difficult I.V. access or unexpected hospital admissions.  NO malignant hyperthermia and or pseudo cholinesterase deficiency.    The patient is not  a Denominational and will accept blood and blood products if medically indicated.   No history of blood transfusions .Type and screen not sent.     Social History  Social History     Substance and Sexual Activity   Drug Use Never      Social History     Substance and Sexual Activity   Alcohol Use Not Currently    Alcohol/week: 0.0 - 3.0 standard drinks of alcohol      Social History     Tobacco Use   Smoking Status Never    Smokeless Tobacco Never          Family History   Problem Relation Name Age of Onset    Hypertension Mother      Hyperlipidemia Mother      Pancreatic cancer Father      Hypertension Father      Hyperlipidemia Father      Diabetes Father      Heart attack Sister  50    Hypertension Sister      Hyperlipidemia Sister         Allergies   Allergen Reactions    Warfarin Bleeding     P TON WARFARIN FOR AFIB HAD SPONTANEOUS SDH HAS BEEN ADVISED TO AVOID        Prior to Admission medications    Medication Sig Start Date End Date Taking? Authorizing Provider   amLODIPine (Norvasc) 10 mg tablet Take 1 tablet (10 mg) by mouth once daily. 3/4/25   Historical Provider, MD   ascorbic acid, vitamin C, 500 mg capsule Take by mouth once daily.    Historical Provider, MD   atorvastatin (Lipitor) 20 mg tablet Take 1 tablet (20 mg) by mouth once daily. 3/4/25   Historical Provider, MD   chlorhexidine (Peridex) 0.12 % solution Use 15 mL in the mouth or throat once daily for 2 days. 6/2/25 6/4/25  GRACE Payne-CNP   co-enzyme Q-10 30 mg capsule Take 1 capsule (30 mg) by mouth once daily.    Historical Provider, MD   digoxin (Lanoxin) 250 MCG tab;et Take 1 tablet (250 mcg) by mouth once daily.    Historical Provider, MD   doxycycline (Periostat) 20 mg tablet Take 1 tablet (20 mg) by mouth 2 times a day.    Historical Provider, MD   enalapril (Vasotec) 20 mg tablet Take 1 tablet (20 mg) by mouth twice a day. 3/4/25   Historical Provider, MD   ergocalciferol (Vitamin D-2) 50 MCG (2000 UT) capsule capsule Take 1 capsule (50 mcg) by mouth once daily.    Historical Provider, MD   fluticasone (Flonase) 50 mcg/actuation nasal spray 1 spray by Does not apply route once daily. 11/4/24   Historical Provider, MD   gabapentin (Neurontin) 100 mg capsule Take 1 capsule (100 mg) by mouth 3 times a day. 2/24/25 5/25/25  Historical Provider, MD   metoprolol succinate XL (Toprol-XL) 100 mg 24 hr tablet Take 0.5 tablets (50 mg) by mouth once  daily. 2/26/24   Historical Provider, MD   niacin 500 mg tablet Take 0.5 tablets (250 mg) by mouth once daily with breakfast.    Historical Provider, MD   omega 3-dha-epa-fish oil (Fish OiL) 1,200 (144-216) mg capsule Take 1 capsule (1,200 mg) by mouth once daily.    Historical Provider, MD   spironolacton-hydrochlorothiaz (Aldactazide) 25-25 mg tablet Take 0.5 tablets (12.5 mg) by mouth once daily. 3/4/25   Historical Provider, MD   tamsulosin (Flomax) 0.4 mg 24 hr capsule Take 1 capsule (0.4 mg) by mouth once daily.    Historical Provider, MD   thiamine (Vitamin B-1) 50 mg tablet Take 1 tablet (50 mg) by mouth once daily.    Historical Provider, MD        PAT ROS:   Constitutional:   neg    Neuro/Psych:   Eyes:   Ears:   Nose:   neg    Mouth:   neg    Throat:   neg    Neck:   neg    Cardio:   neg    Respiratory:   neg    Endocrine:   GI:   neg    :   neg    Musculoskeletal:    arthralgias (LEFT KNEE)  Hematologic:   neg    Skin:      Physical Exam  Vitals reviewed.   Constitutional:       Appearance: He is obese.   HENT:      Head: Normocephalic.      Mouth/Throat:      Mouth: Mucous membranes are moist.   Eyes:      Extraocular Movements: Extraocular movements intact.      Pupils: Pupils are equal, round, and reactive to light.   Cardiovascular:      Rate and Rhythm: Normal rate and regular rhythm.      Pulses: Normal pulses.      Heart sounds: Normal heart sounds.   Pulmonary:      Effort: Pulmonary effort is normal.      Breath sounds: Normal breath sounds.   Musculoskeletal:         General: Tenderness (b/l knees) present.      Cervical back: Normal range of motion.   Skin:     General: Skin is warm and dry.   Neurological:      Mental Status: He is alert and oriented to person, place, and time.   Psychiatric:         Behavior: Behavior normal.          PAT AIRWAY:   Airway:     Mallampati::  II  normal        Testing/Diagnostic:   EKG IN EPIC   Lab Results   Component Value Date    WBC 6.3 06/02/2025    HGB  "13.6 06/02/2025    HCT 41.2 06/02/2025    MCV 98 06/02/2025     06/02/2025     Lab Results   Component Value Date    GLUCOSE 87 06/02/2025    CALCIUM 9.3 06/02/2025     06/02/2025    K 4.7 06/02/2025    CO2 24 06/02/2025     06/02/2025    BUN 22 06/02/2025    CREATININE 0.97 06/02/2025     Lab Results   Component Value Date    HGBA1C 5.2 06/02/2025         Patient Specialist/PCP:     /77   Pulse 71   Temp 36.9 °C (98.4 °F) (Temporal)   Resp 18   Ht 1.93 m (6' 4\")   Wt 119 kg (262 lb 5.6 oz)   SpO2 98%   BMI 31.93 kg/m²       ASSESSMENT/PLAN    Patient is a 79year-old  scheduled for Right Knee Total Arthroplasty  with Dr. Hannah   on  6/9/25 .  CARDIOVASCULAR:  RCRI score / Risk: The patients score is 1 based on history . Per ACC/AHA guidelines this places him  at  6.0 % risk for MACE undergoing a intermediate  risk procedure . The patient has the following risk factors: hx TIA   Functional Capacity: The patients exercise tolerance is  4  METS. This is based on the patient's limited only due to knee pain . Patient denies  active cardiac symptoms or anginal equivalents .      CARDIAC CLEARANCE :  We have received cardiac risk stratification from the patient's cardiologist on 2/27/25.Dr. BARAJAS has assessed him as a intermediate  risk .    PULMONARY:  The patient has the following factors that place them at increased risk of perioperative pulmonary complications;age greater than 65/BMI greater than 27/deepthi/greater than 2.5 hour procedure.  Postoperatively the patient would benefit from early pulmonary toilet/incentive spirometry q 1-2 hours while awake/pulse oximetry/cautious use of respiratory depressant medications such as opioids/elevate the HOB/oral hygiene.    BPH :  Pt is on (alpha 5- reductase inhibitors) Recommendations: continue throughout perioperative period, monitior for urinary retention, avoid pelayo catheter if able..    Chronic AFIB/ NO ANTICOAGULATION :  Pt was on " warfarin for AFIb . He developed a sponataneous subdural hematoma  requiring [place] hole evacuation .   Pt is not on any antiplatelet or anticoagulation therapy .  Pt at increase risk for DVT.  DVT:  CAPRINI SCORE=11  The patient has the following factors that increase HIS  Risk for thrombus formation ; Virchow's triad , AGE 79, BMI 31, TJR , hx TIA , Surgical procedure >2 hrs  procedure .    Recommendations: DVT prophylaxis  per Dr RAMÍREZ protocol . SCD's, CESAR's, and early ambulation are recommended. Heparin or LMWH is recommended for the very high risk .      Risk assessment complete.  Patient is scheduled for  intermediate  surgical risk procedure.  Patient is considered an increased  risk to proceed with the planned procedure.      Preoperative medication instructions were provided and reviewed with the patient.  Any additional testing or evaluation was explained to the patient.  Nothing by mouth instructions were discussed and patient's questions were answered prior to conclusion to this encounter.  Patient verbalized understanding of preoperative instructions given in preadmission testing; discharge instructions available in EMR.

## 2025-06-02 NOTE — PREPROCEDURE INSTRUCTIONS
Medication List            Accurate as of June 2, 2025  9:30 AM. Always use your most recent med list.                amLODIPine 10 mg tablet  Commonly known as: Norvasc  Medication Adjustments for Surgery: Take on the morning of surgery     ascorbic acid (vitamin C) 500 mg capsule  Medication Adjustments for Surgery: Do Not take on the morning of surgery     atorvastatin 20 mg tablet  Commonly known as: Lipitor  Medication Adjustments for Surgery: Take on the morning of surgery     chlorhexidine 0.12 % solution  Commonly known as: Peridex  Use 15 mL in the mouth or throat once daily for 2 days.  Medication Adjustments for Surgery: Take/Use as prescribed     co-enzyme Q-10 30 mg capsule  Additional Medication Adjustments for Surgery: Take last dose 7 days before surgery     enalapril 20 mg tablet  Commonly known as: Vasotec  Medication Adjustments for Surgery: Take last dose 1 day (24 hours) before surgery     ergocalciferol 50 MCG (2000 UT) capsule capsule  Commonly known as: Vitamin D-2  Medication Adjustments for Surgery: Do Not take on the morning of surgery     Fish OiL 1,200 (144-216) mg capsule  Generic drug: omega 3-dha-epa-fish oil  Additional Medication Adjustments for Surgery: Take last dose 7 days before surgery     fluticasone 50 mcg/actuation nasal spray  Commonly known as: Flonase  Medication Adjustments for Surgery: Take/Use as prescribed     gabapentin 100 mg capsule  Commonly known as: Neurontin  Medication Adjustments for Surgery: Take on the morning of surgery     metoprolol succinate  mg 24 hr tablet  Commonly known as: Toprol-XL  Medication Adjustments for Surgery: Take on the morning of surgery     spironolacton-hydrochlorothiaz 25-25 mg tablet  Commonly known as: Aldactazide  Medication Adjustments for Surgery: Take on the morning of surgery     tamsulosin 0.4 mg 24 hr capsule  Commonly known as: Flomax  Medication Adjustments for Surgery: Take on the morning of surgery                  CONTACT SURGEON'S OFFICE IF YOU DEVELOP:  * Fever = 100.4 F   * New respiratory symptoms (e.g. cough, shortness of breath, respiratory distress, sore throat)  * Recent loss of taste or smell  *Flu like symptoms such as headache, fatigue or gastrointestinal symptoms  * You develop any open sores, shingles, burning or painful urination   AND/OR:  * You no longer wish to have the surgery.  * Any other personal circumstances change that may lead to the need to cancel or defer this surgery.  *You were admitted to any hospital within one week of your planned procedure.    SMOKING:  *Quitting smoking can make a huge difference to your health and recovery from surgery.    *If you need help with quitting, call 0-040-QUIT-NOW.    THE DAY BEFORE SURGERY:  *Do not eat any food after midnight the night before surgery.   You may have up to 13.5 ounces of clear liquid until TWO hours before your instructed arrival time to the hospital  DIABETICS:  Please check fasting blood sugar  upon waking up.  If fasting sugar is <80 mg/dl, please drink 100ml/3oz of apple juice no later than 2 hours prior to surgery.      SURGICAL TIME  *You will be contacted between 2 p.m. and 6 p.m. the business day before your surgery with your arrival time.  *If you haven't received a call by 6pm, call [phone].  *Scheduled surgery times may change and you will be notified if this occurs-check your personal voicemail for any updates.    ON THE MORNING OF SURGERY:  *Wear comfortable, loose fitting clothing.   *Do not use moisturizers, creams, lotions or perfume.  *All jewelry and valuables should be left at home.  *Prosthetic devices such as contact lenses, hearing aids, dentures, eyelash extensions, hairpins and body piercing must be removed before surgery.    BRING WITH YOU:  *Photo ID and insurance card  *Current list of medicines and allergies  *Pacemaker/Defibrillator/Heart stent cards  *CPAP machine and  mask  *Slings/splints/crutches  *Copy of your complete Advanced Directive/DHPOA-if applicable  *Neurostimulator implant remote    PARKING AND ARRIVAL:  *Check in at the Main Entrance desk and let them know you are here for surgery.  *You will be directed to the 2nd floor surgical waiting area.    AFTER OUTPATIENT SURGERY:  *A responsible adult MUST accompany you at the time of discharge and stay with you for 24 hours after your surgery.  *You may NOT drive yourself home after surgery.  *You may use a taxi or ride sharing service ([employer], Uber) to return home ONLY if you are accompanied by a friend or family member.  *Instructions for resuming your medications will be provided by your surgeon.    Home Preoperative Antibacterial Shower     What is a home preoperative antibacterial shower?  This shower is a way of cleaning the skin with a germ killing soap before surgery.  The soap contains chlorhexidine, commonly known as CHG.  CHG is a soap for your skin with germ killing ability.  Let your doctor know if you are allergic to chlorhexidine.    Why do I need to take a preoperative antibacterial shower?  Skin is not sterile.  It is best to try to make your skin as free of germs as possible before surgery.  Proper cleansing with a germ killing soap before surgery can lower the number of germs on your skin.  This helps to reduce the risk of infection at the surgical site.  Following the instructions listed below will help you prepare your skin for surgery.      How do I use the CHG skin cleanser?  Steps:  Begin using your CHG soap five days before your scheduled surgery on ________________________.    Days 1-4 Shower before bed:  Wash your face and genitals with your normal soap and rinse.           2.    Apply the CHG soap to a clean wet washcloth.  Turn the water off or move away                From the water spray to avoid premature rinsing of the CHG soap as you are applying.     3.   Lather your entire body from the  neck down.  Do not use on your face or genitals.  4. Pay special attention to the area(s) where your incision(s) will be located unless they are on your face.  Avoid scrubbing your skin too hard.  The important point is to have the CHG soap sit on your skin for 3 minutes.    When the 3 minutes are up, turn on the water and rinse the CHG soap off your body completely.   Pat yourself dry with a clean, freshly-laundered towel.  Dress in clean, freshly laundered night clothes.    Be sure to change bed sheets and blankets at least on the first night of CHG body wash use. May change linens every night of the above protocol for maximum benefit.   Day 5:  Last shower is the morning of surgery: Follow above Instructions.    NOTE:        *Keep CHG soap out of eyes and ear canals   *DO NOT wash with regular soap on your body after you have used the CHG soap solution  *DO NOT apply powders, lotions, or perfume.  *Deodorant may be used days 1-4, BUT NOT the day of surgery    Who should I contact if I have any questions regarding the use of CHG soap?  Call the [facility], Preadmission Testing at [phone] if you have any questions.              Patient Information: Pre-Operative Infection Prevention Measures     Why did I have my nose, under my arms and groin swabbed?  The purpose of the swab is to identify Staphylococcus aureus inside your nose or on your skin.  The swab was sent to the laboratory for culture.  A positive swab/culture for Staphylococcus aureus is called colonization or carriage.      What is Staphylococcus aureus?  Staphylococcus aureus, also known as “staph”, is a germ found on the skin or in the nose of healthy people.  Sometimes Staphylococcus aureus can get into the body and cause an infection.  This can be minor (such as pimples, boils or other skin problems).  It might also be serious (such as blood infection, pneumonia or a surgical site infection).    What is  Staphylococcus aureus colonization or carriage?  Colonization or carriage means that a person has the germ but is not sick from it.  These bacteria can be spread on the hands or when breathing or sneezing.    How is Staphylococcus aureus spread?  It is most often spread by close contact with a person or item that carries it.    What happens if my culture is positive for Staphylococcus aureus?  Your doctor/medical team will use this information to guide any antibiotic treatment which may be necessary.  Regardless of the culture results, we will clean the inside of your nose with a betadine swab just before you have your surgery.      Will I get an infection if I have Staphylococcus aureus in my nose or on my skin?  Anyone can get an infection with Staphylococcus aureus.  However, the best way to reduce your risk of infection is to follow the instructions provided to you for the use of your CHG soap and dental rinse.        Who should I contact if I have any questions?  Call the [facility], Preadmission Testing at [phone] if you have any questions.          Patient Information: Oral/Dental Rinse  **This is a prescription; pick it up at your preferred local pharmacy **  What is oral/dental rinse?   It is a mouthwash. It is a way of cleaning the mouth with a germ killing solution before your surgery.  The solution contains chlorhexidine, commonly known as CHG.   It is used inside the mouth to kill a bacteria known as Staphylococcus aureus.  Let your doctor know if you are allergic to Chlorhexidine.    Why do I need to use CHG oral/dental rinse?  The CHG oral/dental rinse helps to kill a bacteria in your mouth known a Staphylococcus aureus.     This reduces the risk of infection at the surgical site.      Using your CHG oral/dental rinse  STEPS:  Use your CHG oral/dental rinse after you brush your teeth the night before (at bedtime) and the morning of your surgery.  Follow all  directions on your prescription label.    Use the cap on the container to measure 15ml (fill cap to fill line)  Swish (gargle if you can) the mouthwash in your mouth for at least 30 seconds, (do not to swallow) spit out  After you use your CHG rinse, do not rinse your mouth with water, drink or eat.  Please refer to prescription label for the appropriate time to resume oral intake  Dental rinse comes in one size bottle: 473ml ~16oz.  You will have leftover    rinse, discard after this use.    What side effects might I have using the CHG oral/dental rinse?  CHG rinse will stick to plaque on the teeth.  Brush and floss just before use.  Teeth brushing will help avoid staining of plaque during use.    Who should I contact if I have questions about the CHG oral/dental rinse?  Please call [facility], Preadmission Testing at [phone] if you have any questions

## 2025-06-02 NOTE — NURSING NOTE
Called patient to discuss upcoming surgery. Confirmed that the plan is for a same-day discharge. The patient has obtained their medical equipment. The patient does have a care partner to assist them at home postoperatively. They live in a house.  The patient does have stairs required for navigating the home. The patient currently does not use an assistive device. Reminded patient to follow PAT instructions leading up to surgery and to watch for a phone call from preop the day prior to their surgery to receive details about arrival time. All questions answered at this time.

## 2025-06-03 ENCOUNTER — TELEPHONE (OUTPATIENT)
Dept: ORTHOPEDIC SURGERY | Facility: CLINIC | Age: 80
End: 2025-06-03
Payer: MEDICARE

## 2025-06-04 LAB — STAPHYLOCOCCUS SPEC CULT: ABNORMAL

## 2025-06-09 ENCOUNTER — APPOINTMENT (OUTPATIENT)
Dept: RADIOLOGY | Facility: HOSPITAL | Age: 80
End: 2025-06-09
Payer: MEDICARE

## 2025-06-09 ENCOUNTER — ANESTHESIA EVENT (OUTPATIENT)
Dept: OPERATING ROOM | Facility: HOSPITAL | Age: 80
End: 2025-06-09
Payer: MEDICARE

## 2025-06-09 ENCOUNTER — HOSPITAL ENCOUNTER (OUTPATIENT)
Facility: HOSPITAL | Age: 80
Setting detail: OUTPATIENT SURGERY
Discharge: HOME HEALTH | End: 2025-06-09
Attending: ORTHOPAEDIC SURGERY | Admitting: ORTHOPAEDIC SURGERY
Payer: MEDICARE

## 2025-06-09 ENCOUNTER — PHARMACY VISIT (OUTPATIENT)
Dept: PHARMACY | Facility: CLINIC | Age: 80
End: 2025-06-09
Payer: COMMERCIAL

## 2025-06-09 ENCOUNTER — DOCUMENTATION (OUTPATIENT)
Dept: HOME HEALTH SERVICES | Facility: HOME HEALTH | Age: 80
End: 2025-06-09

## 2025-06-09 ENCOUNTER — ANESTHESIA (OUTPATIENT)
Dept: OPERATING ROOM | Facility: HOSPITAL | Age: 80
End: 2025-06-09
Payer: MEDICARE

## 2025-06-09 ENCOUNTER — HOME HEALTH ADMISSION (OUTPATIENT)
Dept: HOME HEALTH SERVICES | Facility: HOME HEALTH | Age: 80
End: 2025-06-09
Payer: MEDICARE

## 2025-06-09 VITALS
WEIGHT: 262.35 LBS | HEIGHT: 76 IN | OXYGEN SATURATION: 96 % | SYSTOLIC BLOOD PRESSURE: 109 MMHG | DIASTOLIC BLOOD PRESSURE: 68 MMHG | RESPIRATION RATE: 16 BRPM | BODY MASS INDEX: 31.95 KG/M2 | HEART RATE: 76 BPM | TEMPERATURE: 96.8 F

## 2025-06-09 DIAGNOSIS — Z47.1 AFTERCARE FOLLOWING RIGHT KNEE JOINT REPLACEMENT SURGERY: Primary | ICD-10-CM

## 2025-06-09 DIAGNOSIS — Z96.651 AFTERCARE FOLLOWING RIGHT KNEE JOINT REPLACEMENT SURGERY: Primary | ICD-10-CM

## 2025-06-09 DIAGNOSIS — M17.0 PRIMARY OSTEOARTHRITIS OF BOTH KNEES: ICD-10-CM

## 2025-06-09 DIAGNOSIS — G89.29 CHRONIC PAIN OF BOTH KNEES: Primary | ICD-10-CM

## 2025-06-09 DIAGNOSIS — M25.561 CHRONIC PAIN OF BOTH KNEES: Primary | ICD-10-CM

## 2025-06-09 DIAGNOSIS — M25.562 CHRONIC PAIN OF BOTH KNEES: Primary | ICD-10-CM

## 2025-06-09 PROCEDURE — 2780000003 HC OR 278 NO HCPCS: Performed by: ORTHOPAEDIC SURGERY

## 2025-06-09 PROCEDURE — 97161 PT EVAL LOW COMPLEX 20 MIN: CPT | Mod: GP | Performed by: PHYSICAL THERAPIST

## 2025-06-09 PROCEDURE — 2500000004 HC RX 250 GENERAL PHARMACY W/ HCPCS (ALT 636 FOR OP/ED): Performed by: NURSE ANESTHETIST, CERTIFIED REGISTERED

## 2025-06-09 PROCEDURE — RXMED WILLOW AMBULATORY MEDICATION CHARGE

## 2025-06-09 PROCEDURE — 64447 NJX AA&/STRD FEMORAL NRV IMG: CPT | Performed by: STUDENT IN AN ORGANIZED HEALTH CARE EDUCATION/TRAINING PROGRAM

## 2025-06-09 PROCEDURE — 27447 TOTAL KNEE ARTHROPLASTY: CPT | Performed by: ORTHOPAEDIC SURGERY

## 2025-06-09 PROCEDURE — 73560 X-RAY EXAM OF KNEE 1 OR 2: CPT | Mod: RT

## 2025-06-09 PROCEDURE — 3600000010 HC OR TIME - EACH INCREMENTAL 1 MINUTE - PROCEDURE LEVEL FIVE: Performed by: ORTHOPAEDIC SURGERY

## 2025-06-09 PROCEDURE — 2720000007 HC OR 272 NO HCPCS: Performed by: ORTHOPAEDIC SURGERY

## 2025-06-09 PROCEDURE — 2500000005 HC RX 250 GENERAL PHARMACY W/O HCPCS: Performed by: NURSE ANESTHETIST, CERTIFIED REGISTERED

## 2025-06-09 PROCEDURE — 97116 GAIT TRAINING THERAPY: CPT | Mod: GP | Performed by: PHYSICAL THERAPIST

## 2025-06-09 PROCEDURE — 7100000002 HC RECOVERY ROOM TIME - EACH INCREMENTAL 1 MINUTE: Performed by: ORTHOPAEDIC SURGERY

## 2025-06-09 PROCEDURE — 3700000001 HC GENERAL ANESTHESIA TIME - INITIAL BASE CHARGE: Performed by: ORTHOPAEDIC SURGERY

## 2025-06-09 PROCEDURE — 3600000005 HC OR TIME - INITIAL BASE CHARGE - PROCEDURE LEVEL FIVE: Performed by: ORTHOPAEDIC SURGERY

## 2025-06-09 PROCEDURE — 2500000004 HC RX 250 GENERAL PHARMACY W/ HCPCS (ALT 636 FOR OP/ED): Performed by: ORTHOPAEDIC SURGERY

## 2025-06-09 PROCEDURE — 2500000004 HC RX 250 GENERAL PHARMACY W/ HCPCS (ALT 636 FOR OP/ED): Performed by: STUDENT IN AN ORGANIZED HEALTH CARE EDUCATION/TRAINING PROGRAM

## 2025-06-09 PROCEDURE — 7100000009 HC PHASE TWO TIME - INITIAL BASE CHARGE: Performed by: ORTHOPAEDIC SURGERY

## 2025-06-09 PROCEDURE — 2500000001 HC RX 250 WO HCPCS SELF ADMINISTERED DRUGS (ALT 637 FOR MEDICARE OP): Performed by: ORTHOPAEDIC SURGERY

## 2025-06-09 PROCEDURE — 97530 THERAPEUTIC ACTIVITIES: CPT | Mod: GP | Performed by: PHYSICAL THERAPIST

## 2025-06-09 PROCEDURE — A27447 PR TOTAL KNEE ARTHROPLASTY: Performed by: STUDENT IN AN ORGANIZED HEALTH CARE EDUCATION/TRAINING PROGRAM

## 2025-06-09 PROCEDURE — 3700000002 HC GENERAL ANESTHESIA TIME - EACH INCREMENTAL 1 MINUTE: Performed by: ORTHOPAEDIC SURGERY

## 2025-06-09 PROCEDURE — 2500000005 HC RX 250 GENERAL PHARMACY W/O HCPCS: Performed by: ORTHOPAEDIC SURGERY

## 2025-06-09 PROCEDURE — 99100 ANES PT EXTEME AGE<1 YR&>70: CPT | Performed by: STUDENT IN AN ORGANIZED HEALTH CARE EDUCATION/TRAINING PROGRAM

## 2025-06-09 PROCEDURE — 73560 X-RAY EXAM OF KNEE 1 OR 2: CPT | Mod: RIGHT SIDE | Performed by: RADIOLOGY

## 2025-06-09 PROCEDURE — 7100000010 HC PHASE TWO TIME - EACH INCREMENTAL 1 MINUTE: Performed by: ORTHOPAEDIC SURGERY

## 2025-06-09 PROCEDURE — 97110 THERAPEUTIC EXERCISES: CPT | Mod: GP | Performed by: PHYSICAL THERAPIST

## 2025-06-09 PROCEDURE — 7100000001 HC RECOVERY ROOM TIME - INITIAL BASE CHARGE: Performed by: ORTHOPAEDIC SURGERY

## 2025-06-09 PROCEDURE — A27447 PR TOTAL KNEE ARTHROPLASTY: Performed by: NURSE ANESTHETIST, CERTIFIED REGISTERED

## 2025-06-09 PROCEDURE — C1713 ANCHOR/SCREW BN/BN,TIS/BN: HCPCS | Performed by: ORTHOPAEDIC SURGERY

## 2025-06-09 PROCEDURE — 2500000001 HC RX 250 WO HCPCS SELF ADMINISTERED DRUGS (ALT 637 FOR MEDICARE OP): Performed by: STUDENT IN AN ORGANIZED HEALTH CARE EDUCATION/TRAINING PROGRAM

## 2025-06-09 PROCEDURE — A6213 FOAM DRG >16<=48 SQ IN W/BDR: HCPCS | Performed by: ORTHOPAEDIC SURGERY

## 2025-06-09 PROCEDURE — C1776 JOINT DEVICE (IMPLANTABLE): HCPCS | Performed by: ORTHOPAEDIC SURGERY

## 2025-06-09 DEVICE — ATTUNE KNEE SYSTEM TIBIAL INSERT FIXED BEARING POSTERIOR STABILIZED 8 5MM AOX
Type: IMPLANTABLE DEVICE | Site: KNEE | Status: FUNCTIONAL
Brand: ATTUNE

## 2025-06-09 DEVICE — SMARTSET HV HIGH VISCOSITY BONE CEMENT 40G
Type: IMPLANTABLE DEVICE | Site: KNEE | Status: FUNCTIONAL
Brand: SMARTSET

## 2025-06-09 DEVICE — ATTUNE KNEE SYSTEM TIBIAL BASE FIXED BEARING SIZE 9 CEMENTED
Type: IMPLANTABLE DEVICE | Site: KNEE | Status: FUNCTIONAL
Brand: ATTUNE

## 2025-06-09 DEVICE — IMPLANTABLE DEVICE: Type: IMPLANTABLE DEVICE | Site: KNEE | Status: FUNCTIONAL

## 2025-06-09 RX ORDER — MELOXICAM 7.5 MG/1
7.5 TABLET ORAL ONCE
Status: COMPLETED | OUTPATIENT
Start: 2025-06-09 | End: 2025-06-09

## 2025-06-09 RX ORDER — DOCUSATE SODIUM 100 MG/1
100 CAPSULE, LIQUID FILLED ORAL 2 TIMES DAILY
Qty: 20 CAPSULE | Refills: 0 | Status: SHIPPED | OUTPATIENT
Start: 2025-06-09 | End: 2025-06-19

## 2025-06-09 RX ORDER — SODIUM CHLORIDE 0.9 G/100ML
INJECTION, SOLUTION IRRIGATION AS NEEDED
Status: DISCONTINUED | OUTPATIENT
Start: 2025-06-09 | End: 2025-06-09 | Stop reason: HOSPADM

## 2025-06-09 RX ORDER — WATER 1 ML/ML
INJECTION IRRIGATION AS NEEDED
Status: DISCONTINUED | OUTPATIENT
Start: 2025-06-09 | End: 2025-06-09 | Stop reason: HOSPADM

## 2025-06-09 RX ORDER — SODIUM CHLORIDE, SODIUM LACTATE, POTASSIUM CHLORIDE, CALCIUM CHLORIDE 600; 310; 30; 20 MG/100ML; MG/100ML; MG/100ML; MG/100ML
INJECTION, SOLUTION INTRAVENOUS CONTINUOUS PRN
Status: DISCONTINUED | OUTPATIENT
Start: 2025-06-09 | End: 2025-06-09

## 2025-06-09 RX ORDER — OXYCODONE HYDROCHLORIDE 5 MG/1
5 TABLET ORAL EVERY 6 HOURS PRN
Qty: 28 TABLET | Refills: 0 | Status: SHIPPED | OUTPATIENT
Start: 2025-06-09 | End: 2025-06-13 | Stop reason: SDUPTHER

## 2025-06-09 RX ORDER — OXYCODONE HYDROCHLORIDE 5 MG/1
5 TABLET ORAL EVERY 4 HOURS PRN
Status: DISCONTINUED | OUTPATIENT
Start: 2025-06-09 | End: 2025-06-09 | Stop reason: HOSPADM

## 2025-06-09 RX ORDER — TRANEXAMIC ACID 1 G/10ML
INJECTION, SOLUTION INTRAVENOUS AS NEEDED
Status: DISCONTINUED | OUTPATIENT
Start: 2025-06-09 | End: 2025-06-09

## 2025-06-09 RX ORDER — LIDOCAINE HYDROCHLORIDE 10 MG/ML
0.1 INJECTION, SOLUTION EPIDURAL; INFILTRATION; INTRACAUDAL; PERINEURAL ONCE
Status: DISCONTINUED | OUTPATIENT
Start: 2025-06-09 | End: 2025-06-09 | Stop reason: HOSPADM

## 2025-06-09 RX ORDER — ROPIVACAINE/EPI/CLONIDINE/KET 2.46-0.005
SYRINGE (ML) INJECTION AS NEEDED
Status: DISCONTINUED | OUTPATIENT
Start: 2025-06-09 | End: 2025-06-09 | Stop reason: HOSPADM

## 2025-06-09 RX ORDER — DIPHENHYDRAMINE HYDROCHLORIDE 50 MG/ML
12.5 INJECTION, SOLUTION INTRAMUSCULAR; INTRAVENOUS ONCE AS NEEDED
Status: DISCONTINUED | OUTPATIENT
Start: 2025-06-09 | End: 2025-06-09 | Stop reason: HOSPADM

## 2025-06-09 RX ORDER — CEFAZOLIN 1 G/1
INJECTION, POWDER, FOR SOLUTION INTRAVENOUS AS NEEDED
Status: DISCONTINUED | OUTPATIENT
Start: 2025-06-09 | End: 2025-06-09

## 2025-06-09 RX ORDER — ONDANSETRON HYDROCHLORIDE 2 MG/ML
INJECTION, SOLUTION INTRAVENOUS AS NEEDED
Status: DISCONTINUED | OUTPATIENT
Start: 2025-06-09 | End: 2025-06-09

## 2025-06-09 RX ORDER — FENTANYL CITRATE 50 UG/ML
INJECTION, SOLUTION INTRAMUSCULAR; INTRAVENOUS
Status: COMPLETED | OUTPATIENT
Start: 2025-06-09 | End: 2025-06-09

## 2025-06-09 RX ORDER — LABETALOL HYDROCHLORIDE 5 MG/ML
5 INJECTION, SOLUTION INTRAVENOUS ONCE AS NEEDED
Status: DISCONTINUED | OUTPATIENT
Start: 2025-06-09 | End: 2025-06-09 | Stop reason: HOSPADM

## 2025-06-09 RX ORDER — DEXMEDETOMIDINE IN 0.9 % NACL 20 MCG/5ML
SYRINGE (ML) INTRAVENOUS AS NEEDED
Status: DISCONTINUED | OUTPATIENT
Start: 2025-06-09 | End: 2025-06-09

## 2025-06-09 RX ORDER — KETOROLAC TROMETHAMINE 10 MG/1
10 TABLET, FILM COATED ORAL EVERY 6 HOURS PRN
Qty: 12 TABLET | Refills: 0 | Status: SHIPPED | OUTPATIENT
Start: 2025-06-09 | End: 2025-06-12

## 2025-06-09 RX ORDER — ACETAMINOPHEN 325 MG/1
975 TABLET ORAL ONCE
Status: COMPLETED | OUTPATIENT
Start: 2025-06-09 | End: 2025-06-09

## 2025-06-09 RX ORDER — ONDANSETRON 4 MG/1
4 TABLET, FILM COATED ORAL EVERY 8 HOURS PRN
Qty: 20 TABLET | Refills: 0 | Status: SHIPPED | OUTPATIENT
Start: 2025-06-09

## 2025-06-09 RX ORDER — ROPIVACAINE HYDROCHLORIDE 5 MG/ML
INJECTION, SOLUTION EPIDURAL; INFILTRATION; PERINEURAL
Status: COMPLETED | OUTPATIENT
Start: 2025-06-09 | End: 2025-06-09

## 2025-06-09 RX ORDER — SCOPOLAMINE 1 MG/3D
1 PATCH, EXTENDED RELEASE TRANSDERMAL
Status: DISCONTINUED | OUTPATIENT
Start: 2025-06-09 | End: 2025-06-09 | Stop reason: HOSPADM

## 2025-06-09 RX ORDER — MIDAZOLAM HYDROCHLORIDE 1 MG/ML
INJECTION INTRAMUSCULAR; INTRAVENOUS AS NEEDED
Status: DISCONTINUED | OUTPATIENT
Start: 2025-06-09 | End: 2025-06-09

## 2025-06-09 RX ORDER — PREGABALIN 75 MG/1
75 CAPSULE ORAL ONCE
Status: COMPLETED | OUTPATIENT
Start: 2025-06-09 | End: 2025-06-09

## 2025-06-09 RX ORDER — MIDAZOLAM HYDROCHLORIDE 1 MG/ML
INJECTION, SOLUTION INTRAMUSCULAR; INTRAVENOUS
Status: COMPLETED | OUTPATIENT
Start: 2025-06-09 | End: 2025-06-09

## 2025-06-09 RX ORDER — KETOROLAC TROMETHAMINE 30 MG/ML
INJECTION, SOLUTION INTRAMUSCULAR; INTRAVENOUS AS NEEDED
Status: DISCONTINUED | OUTPATIENT
Start: 2025-06-09 | End: 2025-06-09

## 2025-06-09 RX ORDER — PROPOFOL 10 MG/ML
INJECTION, EMULSION INTRAVENOUS CONTINUOUS PRN
Status: DISCONTINUED | OUTPATIENT
Start: 2025-06-09 | End: 2025-06-09

## 2025-06-09 RX ORDER — SODIUM CHLORIDE, SODIUM LACTATE, POTASSIUM CHLORIDE, CALCIUM CHLORIDE 600; 310; 30; 20 MG/100ML; MG/100ML; MG/100ML; MG/100ML
100 INJECTION, SOLUTION INTRAVENOUS CONTINUOUS
Status: DISCONTINUED | OUTPATIENT
Start: 2025-06-09 | End: 2025-06-09 | Stop reason: HOSPADM

## 2025-06-09 RX ORDER — ASPIRIN 81 MG/1
81 TABLET ORAL EVERY 12 HOURS
Qty: 56 TABLET | Refills: 0 | Status: SHIPPED | OUTPATIENT
Start: 2025-06-09

## 2025-06-09 RX ORDER — ONDANSETRON HYDROCHLORIDE 2 MG/ML
4 INJECTION, SOLUTION INTRAVENOUS ONCE AS NEEDED
Status: DISCONTINUED | OUTPATIENT
Start: 2025-06-09 | End: 2025-06-09 | Stop reason: HOSPADM

## 2025-06-09 RX ORDER — LIDOCAINE HYDROCHLORIDE 20 MG/ML
INJECTION, SOLUTION EPIDURAL; INFILTRATION; INTRACAUDAL; PERINEURAL AS NEEDED
Status: DISCONTINUED | OUTPATIENT
Start: 2025-06-09 | End: 2025-06-09

## 2025-06-09 RX ADMIN — TRANEXAMIC ACID 1000 MG: 1 INJECTION, SOLUTION INTRAVENOUS at 07:41

## 2025-06-09 RX ADMIN — KETOROLAC TROMETHAMINE 15 MG: 30 INJECTION, SOLUTION INTRAMUSCULAR at 09:52

## 2025-06-09 RX ADMIN — SCOPOLAMINE 1 PATCH: 1.5 PATCH, EXTENDED RELEASE TRANSDERMAL at 06:33

## 2025-06-09 RX ADMIN — FENTANYL CITRATE 100 MCG: 50 INJECTION, SOLUTION INTRAMUSCULAR; INTRAVENOUS at 07:15

## 2025-06-09 RX ADMIN — SODIUM CHLORIDE, POTASSIUM CHLORIDE, SODIUM LACTATE AND CALCIUM CHLORIDE: 600; 310; 30; 20 INJECTION, SOLUTION INTRAVENOUS at 07:30

## 2025-06-09 RX ADMIN — OXYCODONE HYDROCHLORIDE 5 MG: 5 TABLET ORAL at 10:46

## 2025-06-09 RX ADMIN — MIDAZOLAM HYDROCHLORIDE 2 MG: 1 INJECTION, SOLUTION INTRAMUSCULAR; INTRAVENOUS at 07:15

## 2025-06-09 RX ADMIN — Medication 4 MCG: at 09:38

## 2025-06-09 RX ADMIN — Medication 8 MCG: at 09:02

## 2025-06-09 RX ADMIN — PROPOFOL 40 MG: 10 INJECTION, EMULSION INTRAVENOUS at 09:44

## 2025-06-09 RX ADMIN — Medication 8 MCG: at 09:11

## 2025-06-09 RX ADMIN — ONDANSETRON 4 MG: 2 INJECTION, SOLUTION INTRAMUSCULAR; INTRAVENOUS at 09:52

## 2025-06-09 RX ADMIN — PROPOFOL 20 MG: 10 INJECTION, EMULSION INTRAVENOUS at 09:10

## 2025-06-09 RX ADMIN — ACETAMINOPHEN 975 MG: 325 TABLET, FILM COATED ORAL at 06:33

## 2025-06-09 RX ADMIN — MEPIVACAINE HYDROCHLORIDE 4 ML: 15 INJECTION, SOLUTION EPIDURAL; INFILTRATION at 07:37

## 2025-06-09 RX ADMIN — DEXAMETHASONE SODIUM PHOSPHATE 4 MG: 4 INJECTION, SOLUTION INTRAMUSCULAR; INTRAVENOUS at 08:28

## 2025-06-09 RX ADMIN — MIDAZOLAM HYDROCHLORIDE 2 MG: 1 INJECTION, SOLUTION INTRAMUSCULAR; INTRAVENOUS at 07:31

## 2025-06-09 RX ADMIN — PREGABALIN 75 MG: 75 CAPSULE ORAL at 06:33

## 2025-06-09 RX ADMIN — POVIDONE-IODINE 1 APPLICATION: 5 SOLUTION TOPICAL at 06:33

## 2025-06-09 RX ADMIN — PROPOFOL 30 MG: 10 INJECTION, EMULSION INTRAVENOUS at 08:12

## 2025-06-09 RX ADMIN — PROPOFOL 150 MCG/KG/MIN: 10 INJECTION, EMULSION INTRAVENOUS at 07:41

## 2025-06-09 RX ADMIN — MELOXICAM 7.5 MG: 7.5 TABLET ORAL at 06:33

## 2025-06-09 RX ADMIN — ROPIVACAINE HYDROCHLORIDE 20 ML: 5 INJECTION, SOLUTION EPIDURAL; INFILTRATION; PERINEURAL at 07:15

## 2025-06-09 RX ADMIN — CEFAZOLIN 3 G: 1 INJECTION, POWDER, FOR SOLUTION INTRAMUSCULAR; INTRAVENOUS at 07:41

## 2025-06-09 RX ADMIN — LIDOCAINE HYDROCHLORIDE 60 MG: 20 INJECTION, SOLUTION EPIDURAL; INFILTRATION; INTRACAUDAL; PERINEURAL at 07:41

## 2025-06-09 ASSESSMENT — PAIN - FUNCTIONAL ASSESSMENT
PAIN_FUNCTIONAL_ASSESSMENT: 0-10

## 2025-06-09 ASSESSMENT — PAIN SCALES - GENERAL
PAINLEVEL_OUTOF10: 3
PAINLEVEL_OUTOF10: 0 - NO PAIN
PAINLEVEL_OUTOF10: 2
PAINLEVEL_OUTOF10: 0 - NO PAIN
PAINLEVEL_OUTOF10: 3
PAINLEVEL_OUTOF10: 3
PAINLEVEL_OUTOF10: 1
PAINLEVEL_OUTOF10: 3

## 2025-06-09 ASSESSMENT — PAIN DESCRIPTION - LOCATION: LOCATION: KNEE

## 2025-06-09 ASSESSMENT — COGNITIVE AND FUNCTIONAL STATUS - GENERAL
STANDING UP FROM CHAIR USING ARMS: A LITTLE
MOBILITY SCORE: 19
CLIMB 3 TO 5 STEPS WITH RAILING: A LITTLE
MOVING TO AND FROM BED TO CHAIR: A LITTLE
WALKING IN HOSPITAL ROOM: A LITTLE
TURNING FROM BACK TO SIDE WHILE IN FLAT BAD: A LITTLE

## 2025-06-09 ASSESSMENT — COLUMBIA-SUICIDE SEVERITY RATING SCALE - C-SSRS
2. HAVE YOU ACTUALLY HAD ANY THOUGHTS OF KILLING YOURSELF?: NO
1. IN THE PAST MONTH, HAVE YOU WISHED YOU WERE DEAD OR WISHED YOU COULD GO TO SLEEP AND NOT WAKE UP?: NO
6. HAVE YOU EVER DONE ANYTHING, STARTED TO DO ANYTHING, OR PREPARED TO DO ANYTHING TO END YOUR LIFE?: NO

## 2025-06-09 ASSESSMENT — ACTIVITIES OF DAILY LIVING (ADL)
ADLS_ADDRESSED: NO
ADL_ASSISTANCE: INDEPENDENT

## 2025-06-09 ASSESSMENT — PAIN DESCRIPTION - ORIENTATION: ORIENTATION: RIGHT

## 2025-06-09 NOTE — DISCHARGE INSTRUCTIONS
Postoperative Instructions: Total Joint Replacement    POSTOPERATIVE MEDICATIONS  PAIN MEDICATION  Pain medications have been prescribed for post-operative pain control. Take in conjunction with ice/cold therapy to assist with swelling and pain. If prescribed multiple pain medications, be sure to alternate administration times throughout the day so that you can take something every few hours. Consider taking Tylenol in between narcotic administration times (keep in mind Percocet/Vicodin contain Tylenol and not to exceed Tylenol limit of 4grams in 24 hours). Prescription will generally be for 7 days at a time and refills will be sent upon request. For refills, request via [employer] or call surgeon's office during business hours and follow up with your pharmacy regarding status and pickup.    Side effects may be constipation and nausea, vomiting, sleepiness, dizziness, lightheadedness, headache, blurred vision, dry mouth, sweating, itching (if you have itching, over-the -counter Benadryl can be used as needed).  You may NOT operate a motor vehicle while taking narcotic pain medication.    BLOOD THINNER  Aspirin or another medication has been prescribed as a blood thinner to prevent blood clots in your leg or lungs. Take as prescribed on the bottle. You will not receive a refill on this medication.  Do not take this medication if you are on another blood thinner.  Do not take any anti-inflammatory medications such as Meloxicam, Celebrex, Ibuprofen, Motrin, Aleve, or Advil while using the Aspirin or another blood thinner unless instructed otherwise by your surgeon.       STOOL SOFTENERS/LAXATIVES  Post-operative constipation can result due to a combination of inactivity, anesthesia and pain medication. To help prevent this, you should increase your water and fiber intake. Physical activity, such as walking, will also help stimulate the bowels. If you are not having regular bowel movements, increase your bowel  "regimen!  Consider constipation prevention and treatment medications if not prescribed by your surgeon. These are available over the counter at the drug store (The pharmacist on staff may also make recommendations):  Stool Softener: Colace  Laxative: Senna, Miralax, Milk of Magnesia, Magnesium Citrate    WOUND CARE  Pain and swelling are normal following surgery and can last for weeks to months depending on the patient.  To help relieve these symptoms, please follow the post-operative pain regimen as it has been prescribed, use ice often, wear compression stockings every day as prescribed, and elevate your leg every hour.   You have a waterproof bandage on your wound and may shower with this on. The waterproof bandage is to remain in place for a minimum of 6 -7 days. Home care will remove it. You may leave your incision open to air after the bandage has been removed. Once the dressing is removed, you may see steri strips, surgical mesh, or glue. Do not peel or cut any of these items, they will fall off on their own or your surgeon will address at your follow up appointment.   DO NOT soak your incision in a bath, hot tub, pool or pond/lake for a minimum of 8 weeks following your surgery.  DO NOT use lotions, creams, ointments on your wound for a minimum of 6 weeks following your surgery. At that time you may use vitamin E to assist with softening of your incision.    _____ TOTAL HIP ARTHROPLASTY  After surgery, you will have a compression stocking on your operative leg. Continue to wear the compression stocking for 4 weeks to prevent blood clots in your leg or lungs. Remove compression stockings at night.  If there is continued drainage or bleeding, cover with an abdominal pad and tape.   posterior hip precautions:   Don't lean forward while you sit down or stand up, and don't bend past 90 degrees (like the angle in a letter \"L\"). This means you can't try to  something off the floor or bend down to tie your " shoes. Don't lift your knee higher than your hip.  Don't sit on low chairs, beds, or toilets. You may want to use a raised toilet seat for a while. Sit in chairs with arms. Imagine there's a line running down the middle of your body. Keep your legs from crossing over it.  When you get into a car, back up to the seat of the car, and then sit and slide across the seat toward the middle of the car with your knees about 12 inches apart. A plastic bag on the seat can help you slide in and out of the car.  Don't cross your legs when you sit.  Don't cross your ankles while lying down.  It may help to keep a pillow between your knees when you're in bed.    __X___ TOTAL KNEE ARTHROPLASTY  After surgery, you will have a compression stocking and ACE wrap on your operative leg. Continue to wear the compression stocking for 4 weeks to prevent blood clots in your leg or lungs. Remove compression stockings at night. ACE wrap can be removed on postop day 1 or removed by homecare therapist at their first visit. Can use ACE wrap intermittently for swelling.  Elevate your leg periodically to help with swelling. BE SURE to place the support under your ankle, NOT under your knee. You want your knee as straight as possible.    JOINT CARE TEAM  For nursing or wound care questions within the first 6 weeks after surgery, please contact the joint replacement nurse at the facility where you had surgery.  If you are leaving a message, please include your full name, date of birth and date of surgery so that we can identify correctly identify you.  For messages left outside of normal business hours, your call will be returned on the next business day.  Please do not leave emergent messages outside of normal business hours that cannot wait until the next business day.  For orthopedic concerns longer than 6 weeks after your surgery, you will need to call the office to schedule an appointment to be seen.    [facility]:  Rajiv Matt RN,  [phone] or Alice Rosa RN, [phone]        RESTARTING HOME MEDICATIONS/DIET  You may restart your home medications the following day after your surgery UNLESS you have been given alternate instructions.  Follow the instructions given to you on your hospital discharge instructions for more information regarding your home medications.  Resume your normal diet after surgery. If you are on a specific type of diet for your condition, resume that instead.  Choose foods that help promote good bowel habits and prevent constipation, such as foods high in fiber. Be sure to drink water to stay hydrated and to prevent constipation.       DENTAL PROCEDURES & CLEANINGS  All patients must wait a minimum of 3 months for elective dental appointments, including routine cleanings, as to prevent total joint replacement infections. Please refer to your surgeon as to whether you will need antibiotics for future dental appointments.     EMERGENCIES  When to contact our office immediately:  Fever >101.5 for at least 48 hours after surgery or chills.  Excessive bleeding from incision(s). A small amount of drainage is normal and expected.  Signs of infection of incision(s)-excessive drainage that is soaking through your dressing (especially if it is pus-like), redness that is spreading out from the edges of your incision, or increased warmth around the area.  Excruciating pain for which the pain medication, taken as instructed, is not helping.  Severe calf pain.  Go directly to the emergency room or call 911, if you are experiencing chest pain, shortness of breath, or difficulty breathing.    IN-HOME PHYSICAL THERAPY & OUTPATIENT PHYSICAL THERAPY  In-home physical therapy will start 1-2 days after you get home from the hospital.  The home care agency will call you prior to their first visit.  Please refer to the contact information on your hospital discharge instructions if you need to contact them.  Make sure to provide a  phone number with the ability for the home care staff to leave a message if you do not answer your phone.  Please continue to complete the assigned home exercises two times per day on the days that physical therapy is not scheduled to come to your home.    Following a total knee replacement, you should plan to transition from in-home therapy to outpatient therapy 2-3 weeks after surgery.  Patients should be making their first appointment several weeks in advance to avoid delays. You may use the physical therapy location of your choice; it is the patient's responsibility to make sure the location chosen is covered by insurance.  If you are having a hip replacement and need additional therapy, please contact the office for an order.    It is common to have a temporary increase in pain and swelling upon starting outpatient physical therapy and/or changing your exercise routine.  Continue to use ice to help with symptoms.    DRIVING & TRAVEL AFTER SURGERY   Patients should anticipate waiting at least 4-6 weeks before traveling long distances after surgery.  You will need to stop to walk around ever 1 hour during your travel to help with blood clot prevention.  Please call the office or your joint nurse to discuss prior to post-surgical travel.  Patients may not drive until cleared by the joint nurse or the office.    FOLLOW-UP APPOINTMENT  Please call your surgeon's office within 2 weeks following surgery to schedule a follow up visit.    ICE  You have been prescribed to ice your total joint at a minimum of twice per hour for 20 minutes while awake during the first 6 weeks after surgery if you are using ice packs. This will help with pain control. Be sure to have a layer of protection between the ice pack and your skin. Ice packs should be rotated on for 20 minutes and off for 20 minutes to prevent frost bite.   If you are using an ice machine, please follow ice machine instructions and tips below.    [facility] [place] Cold  Therapy Machine Recommendations    Cold therapy devices can be used before and after surgery to assist in comfort and help to reduce pain and swelling.  These devices differ from ice or ice packs whereas the mechanism circulates water through tubing and a pad to provide longer periods of cold therapy to the desired site.  While in the hospital, you can use your cold devices around the clock for optimal comfort.  We recommend using cold therapy after working with therapy or completing exercises on your own.  Once you are discharged home, there is no set schedule in which you must follow while using cold therapy.  Below are a few points to remember when using a cold therapy device:    Read the 's instructions prior to first the use.  Follow instructions for filling the cooler (water first, then ice).  Always make sure there is a layer of protection between the cold pad and your skin (Clothing, Towel, Ace Bandage, etc.)  Allow the device to circulate cold water throughout the pad prior wrapping the pad around your leg (approximately 10 minutes).  Place the pad on your leg in the desired position to meet your pain management needs and use the wraps provided to secure the pad to your body.  The purpose of this device is to use consistently throughout the day.  You do not need to need to use the 20 on, 20 off method.  During waking hours, remove the cold pad every 1-2 hours to perform a skin check  Detach the pad from the cooler and ambulate at least once every hour  After removing the pad, allow at least 30 minutes before resuming cold therapy  You may wear the cold therapy device during periods of sleep including overnight    If you wake up during the night, you can check the skin at this time.  You do not need to wake up specifically to perform skin checks.  Empty the cooler and pad when device is not in use.  Follow 's instructions for cleaning your cold therapy device.

## 2025-06-09 NOTE — ANESTHESIA PREPROCEDURE EVALUATION
Patient: Colby Hunter    Procedure Information       Date/Time: 06/09/25 0705    Procedure: Right Knee Total Arthroplasty (Right: Knee)    Location: [facility] A OR 17 / [facility] OR    Surgeons: Lan Hannah MD            Relevant Problems   Musculoskeletal   (+) Primary osteoarthritis of both knees       Clinical information reviewed:   Tobacco  Allergies  Meds   Med Hx  Surg Hx   Fam Hx  Soc Hx        NPO Detail:  NPO/Void Status  Date of Last Liquid: 06/09/25  Time of Last Liquid: 0430  Date of Last Solid: 06/08/25  Time of Last Solid: 1700         Physical Exam    Airway  Mallampati: II  TM distance: >3 FB  Neck ROM: full     Cardiovascular   Rhythm: regular  Rate: normal     Dental    Pulmonary Breath sounds clear to auscultation     Abdominal            Anesthesia Plan    History of general anesthesia?: yes  History of complications of general anesthesia?: no    ASA 3     spinal     intravenous induction   Anesthetic plan and risks discussed with patient.    Plan discussed with CRNA.

## 2025-06-09 NOTE — BRIEF OP NOTE
Date: 2025  OR Location: [facility] OR    Name: Colby Hunter, : 1945, Age: 79 y.o., MRN: [id number], Sex: male    Diagnosis  Pre-op Diagnosis      * Primary osteoarthritis of both knees [M17.0] Post-op Diagnosis     * Primary osteoarthritis of both knees [M17.0]     Procedures  Right Knee Total Arthroplasty  03381 - [place] ARTHRP KNE CONDYLE&PLATU MEDIAL&LAT COMPARTMENTS      Surgeons      * Lan Hannah - Primary    Resident/Fellow/Other Assistant:  Surgeons and Role:     * Eron Ray MD - Assisting     * Vera Wright MD - Resident - Assisting    Staff:   Genevieveulator: Pretty Lovett Person: Ariel Lovett Person: Erik Rivas Scrub: Darrick    Anesthesia Staff: Anesthesiologist: Wes Glass MD  CRNA: SCOTT FloresCRNA  Frontline Breaker: SCOTT CaballeroCRNA    Procedure Summary  Anesthesia: Spinal  ASA: III  Estimated Blood Loss: 25mL  Intra-op Medications:   Administrations occurring from 07 to 09 on 25:   Medication Name Total Dose   sterile water irrigation solution 1,000 mL   sodium chloride 0.9 % irrigation solution 3,000 mL   ropivacaine-epinephrine-clonidine-ketorolac 2.46-0.005- 0.0008-0.3mg/mL periarticular syringe 50 mL   sodium chloride 0.9 % irrigation solution 1,000 mL   fentaNYL (Sublimaze) injection 50 mcg/mL 100 mcg   midazolam (Versed) injection 1 mg/mL 2 mg   ropivacaine (Naropin) injection 0.5 % 20 mL   ceFAZolin (Ancef) vial 1 g 3 g   dexAMETHasone (Decadron) 4 mg/mL IV Syringe 2 mL 4 mg   dexMEDETOMidine 4 mcg/mL in NS syringe 16 mcg   lactated Ringer's infusion Cannot be calculated   lidocaine PF (Xylocaine-MPF) local injection 2 % 60 mg   mepivacaine (Carbocaine) injection 1.5 % 4 mL   midazolam PF (Versed) injection 1 mg/mL 2 mg   propofol (Diprivan) injection 10 mg/mL 1,361.38 mg   tranexamic acid (Cyklokapron) injection 1,000 mg              Anesthesia Record               Intraprocedure I/O Totals          Intake    Tranexamic Acid 0.00 mL     The total shown is the total volume documented since Anesthesia Start was filed.    lactated Ringer's 750.00 mL    Total Intake 750 mL       Output    Urine 0 mL    Est. Blood Loss 5 mL    Total Output 5 mL       Net    Net Volume 745 mL          Specimen: No specimens collected               Findings: Right knee osteoarthritis     Complications:  None; patient tolerated the procedure well.     Disposition: PACU - hemodynamically stable.  Condition: stable  Specimens Collected: No specimens collected  Attending Attestation: I was present and scrubbed for the entire procedure.    Lan Hannah  Phone Number: [phone]

## 2025-06-09 NOTE — PROGRESS NOTES
Physical Therapy    Physical Therapy Evaluation & Treatment    Patient Name: Colby Hunter  MRN: [id number]  Department:   Room: [facility] Date: 6/9/2025   Time Calculation  Start Time: 1120  Stop Time: 1211  Time Calculation (min): 51 min    Assessment/Plan   PT Assessment  PT Assessment Results: Decreased strength, Decreased range of motion, Decreased mobility, Orthopedic restrictions, Pain  Rehab Prognosis: Excellent  Barriers to Discharge Home: No anticipated barriers  Evaluation/Treatment Tolerance: Patient tolerated treatment well  Medical Staff Made Aware: Yes  Strengths: Ability to acquire knowledge, Attitude of self, Access to adaptive/assistive products, Support and attitude of living partners, Support of extended family/friends, Premorbid level of function  Barriers to Participation: Comorbidities  End of Session Communication: Bedside nurse  Assessment Comment: Pt is a 80 y/o male POD 0 s/p R TKA with total knee precautions and WBAT. The pt presents with decreased independence with bed mobility, transfers, gait, and stairs.  Contributing to these impairment are post-op pain, decreased R knee ROM and strength, and decreased stability. Pt would benefit from continued PT to assess progress and address the above functional limitations and impairments to improve independence and safety at home and in the community.  Pt has achieved all PT goals relevant to homegoing at this time and is ok to discharge from a PT standpoint.     End of Session Patient Position: On cart, Alarm off, caregiver present (needs in reach; family present)   IP OR SWING BED PT PLAN  Inpatient or Swing Bed: Inpatient  PT Plan  Treatment/Interventions: Transfer training, Gait training, Stair training, Therapeutic exercise, Strengthening, Range of motion, Home exercise program, Therapeutic activity  PT Plan: PT Eval only  PT Eval Only Reason: Safe to return home  PT Frequency: PT eval only  PT Discharge Recommendations:  Low intensity level of continued care  Equipment Recommended upon Discharge: Wheeled walker, Straight cane (pt owns recommended equipment)  PT Recommended Transfer Status: Stand by assist, Assistive device (walker)  PT - OK to Discharge: Yes (per PT POC)      Subjective     PT Visit Info:  PT Received On: 06/09/25  General Visit Information:  General  Reason for Referral: POD 0 s/p R TKA with Dr Hannah  Referred By: Vera Wright MD  Past Medical History Relevant to Rehab: Medical History[1]  Surgical History[2]    Family/Caregiver Present: Yes  Caregiver Feedback: Wife (who is a PT) and dtr (who is an RN) both present and engaged throughout session.  Prior to Session Communication: Bedside nurse  Patient Position Received: On cart, Alarm off, caregiver present (PACU bay 90)  Preferred Learning Style: auditory, verbal, written  General Comment: Pt agreeable to PT eval and tx.  Home Living:  Home Living  Type of Home: House  Lives With: Spouse  Home Adaptive Equipment: Walker rolling or standard, Cane  Home Layout: Two level, 1/2 bath on main level, Bed/bath upstairs, Stairs to alternate level with rails  Alternate Level Stairs-Rails: Both  Alternate Level Stairs-Number of Steps: 13  Home Access: Stairs to enter with rails  Entrance Stairs-Rails: Both  Entrance Stairs-Number of Steps: 1  Bathroom Shower/Tub: Tub/shower unit  Bathroom Toilet: Handicapped height  Bathroom Equipment: Grab bars in shower, Grab bars around toilet, Bedside commode  Prior Level of Function:  Prior Function Per Pt/Caregiver Report  Level of [place]: Independent with ADLs and functional transfers, Independent with homemaking with ambulation  Receives Help From: Family  ADL Assistance: Independent  Homemaking Assistance: Independent  Ambulatory Assistance: Independent (community distances withno device)  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  Precautions:  Precautions  LE Weight Bearing Status: Weight Bearing as Tolerated  Medical Precautions: Fall precautions  Post-Surgical Precautions: Right total knee precautions       06/09/25 1142 06/09/25 1149   Vital Signs   Vitals Session During PT During PT   Heart Rate 82 79   Heart Rate Source Monitor Monitor   SpO2 98 % 97 %   /84 111/72   MAP (mmHg) 94 84   BP Location Right arm Right arm   BP Method Automatic Automatic   Patient Position Sitting Standing          Objective   Pain:  Pain Assessment  Pain Assessment: 0-10  0-10 (Numeric) Pain Score: 3  Pain Type: Surgical pain  Pain Location: Knee  Pain Orientation: Right, Anterior  Pain Radiating Towards: thigh  Pain Frequency: Constant/continuous  Pain Interventions: Ambulation/increased activity  Response to Interventions: No change in pain (PT to pt tolerance)  Cognition:  Cognition  Overall Cognitive Status: Within Functional Limits  Attention: Within Functional Limits  Memory: Within Funtional Limits  Safety/Judgement: Within Functional Limits  Insight: Within function limits  Impulsive: Within functional limits  Processing Speed: Within funtional limits    General Assessments:  General Observation  General Observation: Pt is 6'4\"; ace bandage on R knee.     Activity Tolerance  Endurance: Endurance does not limit participation in activity    Sensation  Light Touch: No apparent deficits    Coordination  Movements are Fluid and Coordinated: Yes    Postural Control  Postural Control: Within Functional Limits  Posture Comment: age appropriate    Static Sitting Balance  Static Sitting-Balance Support: No upper extremity supported, Feet supported  Static Sitting-Level of Assistance: Independent  Static Sitting-Comment/Number of Minutes: on EOB  Dynamic Sitting Balance  Dynamic Sitting-Balance Support: No upper extremity supported, Feet supported  Dynamic Sitting-Level of Assistance: Independent  Dynamic Sitting-Balance:  (seate LE " exercises)  Dynamic Sitting-Comments: on EOB    Static Standing Balance  Static Standing-Balance Support: Bilateral upper extremity supported  Static Standing-Level of Assistance: Close supervision  Static Standing-Comment/Number of Minutes: with RW  Dynamic Standing Balance  Dynamic Standing-Balance Support: Bilateral upper extremity supported  Dynamic Standing-Level of Assistance: Close supervision  Dynamic Standing-Balance: Turning  Dynamic Standing-Comments: with RW  Functional Assessments:  Bed Mobility  Bed Mobility: Yes  Bed Mobility 1  Bed Mobility 1: Supine to sitting, Sitting to supine  Level of Assistance 1: Distant supervision    Transfers  Transfer: Yes  Transfer 1  Transfer From 1: Bed to  Transfer to 1: Stand, Sit  Technique 1: Sit to stand, Stand to sit  Transfer Device 1: Walker, Gait belt  Transfer Level of Assistance 1: Close supervision, Minimal verbal cues    Ambulation/Gait Training  Ambulation/Gait Training Performed: Yes  Ambulation/Gait Training 1  Surface 1: Level tile  Device 1: Rolling walker  Gait Support Devices: Gait belt  Assistance 1: Close supervision, Minimal verbal cues  Quality of Gait 1: Decreased step length, Antalgic, Soft knee(s) (started with 3-point step-to pattern and progressed to reciprocal step-through pattern)  Comments/Distance (ft) 1: 76'x1, 100'x1, 35'x1    Stairs  Stairs: Yes  Stairs  Rails 1: Bilateral  Device 1: No device  Support Devices 1: Gait belt  Assistance 1: Close supervision, Minimal verbal cues  Comment/Number of Steps 1: 4 steps; two trials  Extremity/Trunk Assessments:  RUE   RUE : Within Functional Limits  LUE   LUE: Within Functional Limits  RLE   RLE : Exceptions to WFL  AROM RLE (degrees)  R Knee Flexion 0-130: 96 (seated)  R Knee Extension 0-130: -8 (supine)  Strength RLE  RLE Overall Strength: Greater than or equal to 3/5 as evidenced by functional mobility  LLE   LLE : Within Functional Limits  Treatments:  Therapeutic Exercise:  Written HEP  issued this date; pt performed 1x10 reps of B LE exercises including supine QS, GS, AP, SAQ, HS, hip abd, and SLR (assisted, surgical side only), as well as seated LAQ and HS (surgical side only); required CGA for supine R LE SLR and minimal verbal cues for proper form and full ROM with all therapeutic exercises.     Therapeutic Activity:  Pt provided instruction in safe sit<->stand technique to enable him to move in/out of bed/chair safely; pt required minimal verbal cues for proper hand placements and to scoot to edge of sitting surface to facilitate ease of sit->stand, and to line up to and reach back for sitting surface before sitting.    Verbal education provided to patient and spouse for bringing seat back and reclining headrest prior to entry of car as well as placing a plastic bag on the seat for ease of turning.  Spouse able to teach back appropriate car transfer technique without difficulty.    Verbal and written education provided to pt for TKA precautions with emphasis on not pivoting during all mobility today.     Pt stood for grossly 5 minutes with R UE support and CGA; required occasional verbal cues to use UE support for improved safety and stability, and moderate verbal and tactile cues when transitioning from standing at toilet to sink.    Gait & Stairs Training:   Pt provided gait training with RW to enable him to safely ambulate household distances; required minimal verbal cues for walker management, proper sequencing, and safety. Pt progressed to ambulating with a reciprocal, step-through pattern using the RW, SBA, and no cues.     Pt instructed in safe stairclimbing technique using bilateral railings and step-to sequencing; required minimal verbal cues for proper sequencing on first trial and no cues for second trial.    Outcome Measures:  [facility] Basic Mobility  Turning from your back to your side while in a flat bed without using bedrails: None  Moving from lying on your back to sitting on the  side of a flat bed without using bedrails: A little  Moving to and from bed to chair (including a wheelchair): A little  Standing up from a chair using your arms (e.g. wheelchair or bedside chair): A little  To walk in hospital room: A little  Climbing 3-5 steps with railing: A little  Basic Mobility - Total Score: 19    Encounter Problems       Encounter Problems (Resolved)       Mobility       STG - Patient will ambulate >50' with RW and SBA on level surfaces. (Adequate for Discharge)       Start:  06/09/25    Expected End:  06/09/25    Resolved:  06/09/25         STG - Patient will ascend and descend four stairs with two rails and SBA. (Adequate for Discharge)       Start:  06/09/25    Expected End:  06/09/25    Resolved:  06/09/25            PT Transfers       STG - Patient will transfer sit to and from stand with RW and SBA. (Adequate for Discharge)       Start:  06/09/25    Expected End:  06/09/25    Resolved:  06/09/25         Goal 1- Pt will recall 100% of TKA HEP with written handout independently.   (Adequate for Discharge)       Start:  06/09/25    Expected End:  06/09/25    Resolved:  06/09/25         STG - Patient/family will teach back appropriate car transfer technique s/p TKA. (Adequate for Discharge)       Start:  06/09/25    Expected End:  06/09/25    Resolved:  06/09/25                    Education Documentation  Handouts, taught by Esperanza Larose PT at 6/9/2025  3:02 PM.  Learner: Patient  Readiness: Acceptance  Method: Explanation, Demonstration, Handout  Response: Verbalizes Understanding, Demonstrated Understanding  Comment: See treatment comments.    Precautions, taught by Esperanza Larose PT at 6/9/2025  3:02 PM.  Learner: Patient  Readiness: Acceptance  Method: Explanation, Demonstration, Handout  Response: Verbalizes Understanding, Demonstrated Understanding  Comment: See treatment comments.    Body Mechanics, taught by Esperanza Larose PT at 6/9/2025  3:02 PM.  Learner:  Patient  Readiness: Acceptance  Method: Explanation, Demonstration, Handout  Response: Verbalizes Understanding, Demonstrated Understanding  Comment: See treatment comments.    Home Exercise Program, taught by sEperanza Larose PT at 6/9/2025  3:02 PM.  Learner: Patient  Readiness: Acceptance  Method: Explanation, Demonstration, Handout  Response: Verbalizes Understanding, Demonstrated Understanding  Comment: See treatment comments.    Mobility Training, taught by Esperanza Larose PT at 6/9/2025  3:02 PM.  Learner: Patient  Readiness: Acceptance  Method: Explanation, Demonstration, Handout  Response: Verbalizes Understanding, Demonstrated Understanding  Comment: See treatment comments.    Education Comments  No comments found.           [1]   Past Medical History:  Diagnosis Date    Afib (Multi) 05/1997    not on AC d/t SDH    Cardiology follow-up encounter     Dr. Desai    Encounter for pre-operative cardiovascular clearance     scanned to media    Hyperlipidemia     Hypertension     Idiopathic peripheral neuropathy     Obesity     CAROLA on CPAP     Primary osteoarthritis of both knees     Plan: Right Knee Total Arthroplasty 6/9/25    Subdural hematoma (Multi)     9/2011 and 10/2011    TIA (transient ischemic attack) 02/2014    Vitamin D deficiency    [2]   Past Surgical History:  Procedure Laterality Date    LORENZO HOLE FOR SUBDURAL HEMATOMA Bilateral 09/05/2011    ORIF FOREARM FRACTURE Right

## 2025-06-09 NOTE — ANESTHESIA PROCEDURE NOTES
Peripheral Block    Patient location during procedure: pre-op  Medication administered at: 6/9/2025 7:15 AM  End time: 6/9/2025 7:16 AM  Reason for block: at surgeon's request and post-op pain management  Staffing  Performed: attending   Authorized by: Wes Glass MD    Performed by: Wes Glass MD  Preanesthetic Checklist  Completed: patient identified, IV checked, site marked, risks and benefits discussed, surgical consent, monitors and equipment checked, pre-op evaluation and timeout performed   Timeout performed at:   Peripheral Block  Patient position: laying flat  Prep: ChloraPrep and site prepped and draped  Patient monitoring: continuous pulse ox, heart rate and cardiac monitor  Block type: adductor canal  Laterality: right  Injection technique: single-shot  Guidance: ultrasound guided  Local infiltration: lidocaine  Infiltration strength: 1 %  Dose: 3 mL  Needle  Needle type: short-bevel   Needle gauge: 22 G  Needle length: 8 cm  Needle localization: ultrasound guidance  Test dose: negative  Assessment  Injection assessment: negative aspiration for heme, local visualized surrounding nerve on ultrasound, no paresthesia on injection and incremental injection  Heart rate change: no  Slow fractionated injection: yes  Medications Administered  ropivacaine (NAROPIN) 5 MG/ML Perineural - perineural injection   20 mL - 6/9/2025 7:15:00 AM  fentaNYL (SUBLIMAZE) IV - intravenous   100 mcg - 6/9/2025 7:15:00 AM  midazolam (VERSED) IV - intravenous   2 mg - 6/9/2025 7:15:00 AM

## 2025-06-09 NOTE — NURSING NOTE
Met with Patient, Care Partner, and Family at bedside- Patient is s/p Right Total Knee Replacement with Dr. Hannah. Discussion with patient included education on the following topics: TJR Education: Wound Care (Bandage Care & Removal, Personal Hygiene & Infection Prevention), Post-Op Activity (Home PT Regimen, Movement Precautions, Assistive Equipment & 1-2hr Movement), Post-Op Precautions (Falls, Blood Clots & Constipation), Cold-Therapy (Ice vs. Cold Therapy Machines) , Methods for Symptom Management (Pain, Nausea, Swelling & Constipation), Importance of Post-op Prescriptions, How to Obtain Medication Refills, When to Resume Driving & Who to Call, Use of [employer] & Staff Contact Information, When to call 9-1-1, and When to call the Surgeon's Office. Patient attended joint class prior to surgery. Patient is able to verbalize understanding of class content/discussion. Contact information was provided to patient for support and assistance during the post-operative period. He was given My Medication Education tool as a reference for his discharge medications.

## 2025-06-09 NOTE — ANESTHESIA PROCEDURE NOTES
Spinal Block    Patient location during procedure: OR  Start time: 6/9/2025 7:35 AM  End time: 6/9/2025 7:40 AM  Reason for block: primary anesthetic  Staffing  Performed: CRNA   Authorized by: Wes Glass MD    Performed by: LEONELA Flores    Preanesthetic Checklist  Completed: patient identified, IV checked, risks and benefits discussed, surgical consent, monitors and equipment checked, pre-op evaluation, timeout performed and sterile techniques followed  Block Timeout  RN/Licensed healthcare professional reads aloud to the Anesthesia provider and entire team: Patient identity, procedure with side and site, patient position, and as applicable the availability of implants/special equipment/special requirements.  Patient on coagulant treatment: no  Timeout performed at:   Spinal Block  Patient position: sitting  Prep: Betadine  Sterility prep: cap, drape, gloves, hand hygiene and mask  Sedation level: light sedation  Patient monitoring: blood pressure, continuous pulse oximetry and heart rate  Vertebral space: L3-4  Injection technique: single-shot  Needle  Needle type: Quincke   Needle gauge: 22 G  Needle length: 4 in  Free flowing CSF: yes    Assessment  Sensory level: T6 bilateral  Block outcome: block to be assessed in the OR  Procedure assessment: patient sedated but conversant throughout procedure and patient tolerated procedure well with no immediate complications  Additional Notes  Patient sitting back prepped with Betadine,  L3-4 located Lidocaine skin wheal  #22 gauge Quincke easy single shot.  Positive clear CSF  Negative Blood Negative paresthesia  4 Ml of 1.5% Mepivacaine instilled positive Barbotage   Supine level about T-6

## 2025-06-09 NOTE — ANESTHESIA POSTPROCEDURE EVALUATION
Patient: Colby Hunter    Procedure Summary       Date: 06/09/25 Room / Location: [facility] OR 17 / [facility] OR    Anesthesia Start: 0733 Anesthesia Stop: 1007    Procedure: Right Knee Total Arthroplasty (Right: Knee) Diagnosis:       Primary osteoarthritis of both knees      (Primary osteoarthritis of both knees [M17.0])    Surgeons: Lan Hannah MD Responsible Provider: Wes Glass MD    Anesthesia Type: spinal ASA Status: 3            Anesthesia Type: spinal    Vitals Value Taken Time   /66 06/09/25 11:02   Temp 36.2 °C (97.2 °F) 06/09/25 11:02   Pulse 75 06/09/25 11:02   Resp 20 06/09/25 11:02   SpO2 95 % 06/09/25 11:02       Anesthesia Post Evaluation    Patient location during evaluation: bedside  Patient participation: complete - patient participated  Level of consciousness: awake  Pain management: adequate  Multimodal analgesia pain management approach  Airway patency: patent  Cardiovascular status: stable  Respiratory status: spontaneous ventilation and unassisted  Hydration status: acceptable  Postoperative Nausea and Vomiting: none  Comments: No significant PONV.      No notable events documented.

## 2025-06-10 ENCOUNTER — HOME CARE VISIT (OUTPATIENT)
Dept: HOME HEALTH SERVICES | Facility: HOME HEALTH | Age: 80
End: 2025-06-10

## 2025-06-10 ENCOUNTER — HOME CARE VISIT (OUTPATIENT)
Dept: HOME HEALTH SERVICES | Facility: HOME HEALTH | Age: 80
End: 2025-06-10
Payer: MEDICARE

## 2025-06-10 VITALS
TEMPERATURE: 97.4 F | HEART RATE: 76 BPM | OXYGEN SATURATION: 95 % | SYSTOLIC BLOOD PRESSURE: 112 MMHG | RESPIRATION RATE: 18 BRPM | BODY MASS INDEX: 31.9 KG/M2 | HEIGHT: 76 IN | WEIGHT: 262 LBS | DIASTOLIC BLOOD PRESSURE: 68 MMHG

## 2025-06-10 PROCEDURE — 0023 HH SOC

## 2025-06-10 PROCEDURE — 1090000002 HH PPS REVENUE DEBIT

## 2025-06-10 PROCEDURE — 169592 NO-PAY CLAIM PROCEDURE

## 2025-06-10 PROCEDURE — 1090000001 HH PPS REVENUE CREDIT

## 2025-06-10 PROCEDURE — G0151 HHCP-SERV OF PT,EA 15 MIN: HCPCS | Mod: HHH

## 2025-06-10 SDOH — HEALTH STABILITY: PHYSICAL HEALTH: EXERCISE COMMENTS: PT. REQUIRED INSTR. FOR PROPER PACE AND FULL AVAILABLE ROM FOR EACH EXERCISE.

## 2025-06-10 SDOH — HEALTH STABILITY: PHYSICAL HEALTH: PHYSICAL EXERCISE: 10

## 2025-06-10 SDOH — HEALTH STABILITY: PHYSICAL HEALTH

## 2025-06-10 SDOH — HEALTH STABILITY: PHYSICAL HEALTH: EXERCISE ACTIVITY: HEEL SLIDES

## 2025-06-10 SDOH — HEALTH STABILITY: PHYSICAL HEALTH: PHYSICAL EXERCISE: SUPINE

## 2025-06-10 SDOH — HEALTH STABILITY: PHYSICAL HEALTH: EXERCISE ACTIVITIES SETS: 1

## 2025-06-10 SDOH — HEALTH STABILITY: PHYSICAL HEALTH: EXERCISE ACTIVITY: TKES

## 2025-06-10 SDOH — HEALTH STABILITY: PHYSICAL HEALTH: EXERCISE ACTIVITY: QUAD SETS

## 2025-06-10 ASSESSMENT — ACTIVITIES OF DAILY LIVING (ADL)
PHYSICAL_TRANSFERS_DEVICES: WHEELED WALKER
AMBULATION ASSISTANCE: ONE PERSON
AMBULATION ASSISTANCE ON FLAT SURFACES: 1
PHYSICAL TRANSFERS ASSESSED: 1
OASIS_M1830: 05
AMBULATION ASSISTANCE: 1
AMBULATION ASSISTANCE: STAND BY ASSIST
ENTERING_EXITING_HOME: CONTACT GUARD ASSIST
CURRENT_FUNCTION: ONE PERSON
CURRENT_FUNCTION: CONTACT GUARD ASSIST
AMBULATION_DISTANCE/DURATION_TOLERATED: 80 FEET X 1

## 2025-06-10 ASSESSMENT — ENCOUNTER SYMPTOMS
PAIN SEVERITY GOAL: 2/10
PAIN: 1
PERSON REPORTING PAIN: PATIENT
MUSCLE WEAKNESS: 1
SUBJECTIVE PAIN PROGRESSION: WAXING AND WANING
PAIN LOCATION: RIGHT KNEE
LOWEST PAIN SEVERITY IN PAST 24 HOURS: 1/10
LIMITED RANGE OF MOTION: 1
HIGHEST PAIN SEVERITY IN PAST 24 HOURS: 5/10
OCCASIONAL FEELINGS OF UNSTEADINESS: 1

## 2025-06-10 NOTE — OP NOTE
Right Knee Total Arthroplasty (R) Operative Note     Date: 2025  OR Location: [facility] A OR    Name: Colby Hunter, : 1945, Age: 79 y.o., MRN: [id number], Sex: male    Diagnosis  Pre-op Diagnosis      * Primary osteoarthritis of both knees [M17.0] Post-op Diagnosis     * Primary osteoarthritis of both knees [M17.0]     Procedures  Right Knee Total Arthroplasty  56613 - NE ARTHRP KNE CONDYLE&PLATU MEDIAL&LAT COMPARTMENTS      Surgeons      * Lan Hannah - Primary    Resident/Fellow/Other Assistant:  Surgeons and Role:     * Eron Ray MD - Assisting     * Vera Wright MD - Resident - Assisting    Staff:   Genevieveulator: Pretty Lovett Person: Ariel Lovett Person: Erik Rivas Scrub: Darrick    Anesthesia Staff: Anesthesiologist: Wes Glass MD  CRNA: SCOTT FloresCRNA  Frontline Breaker: LEONELA Caballero    Procedure Summary  Anesthesia: Spinal  ASA: III  Estimated Blood Loss: 0mL  Intra-op Medications:   Administrations occurring from 0705 to 0935 on 25:   Medication Name Total Dose   sterile water irrigation solution 1,000 mL   sodium chloride 0.9 % irrigation solution 3,000 mL   ropivacaine-epinephrine-clonidine-ketorolac 2.46-0.005- 0.0008-0.3mg/mL periarticular syringe 50 mL   sodium chloride 0.9 % irrigation solution 1,000 mL   fentaNYL (Sublimaze) injection 50 mcg/mL 100 mcg   midazolam (Versed) injection 1 mg/mL 2 mg   ropivacaine (Naropin) injection 0.5 % 20 mL   ceFAZolin (Ancef) vial 1 g 3 g   dexAMETHasone (Decadron) 4 mg/mL IV Syringe 2 mL 4 mg   dexMEDETOMidine 4 mcg/mL in NS syringe 16 mcg   lactated Ringer's infusion Cannot be calculated   lidocaine PF (Xylocaine-MPF) local injection 2 % 60 mg   mepivacaine (Carbocaine) injection 1.5 % 4 mL   midazolam PF (Versed) injection 1 mg/mL 2 mg   propofol (Diprivan) injection 10 mg/mL 1,361.38 mg   tranexamic acid (Cyklokapron) injection 1,000 mg              Anesthesia Record               Intraprocedure  I/O Totals          Intake    Tranexamic Acid 0.00 mL    The total shown is the total volume documented since Anesthesia Start was filed.    lactated Ringer's 750.00 mL    Total Intake 750 mL       Output    Urine 0 mL    Est. Blood Loss 5 mL    Total Output 5 mL       Net    Net Volume 745 mL          Specimen: No specimens collected              Drains and/or Catheters: * None in log *    Tourniquet Times:     Total Tourniquet Time Documented:  Thigh (Right) - 115 minutes  Total: Thigh (Right) - 115 minutes      Implants:  Implants       Type Name Action Serial No.      Joint Knee BONE CEMENT, SMART SET, HIGH VISCOSITY, 40GM - [id number] Implanted      Joint Knee BONE CEMENT, SMART SET, HIGH VISCOSITY, 40GM - [id number] Implanted       Right size 8 femur Implanted      Joint Knee TIBAL BASE ATTUNE FB, SZ 9 MRECEDES - [id number] Implanted      Joint Knee INSERT, ATTUNE PS FB, SZ 8, 5MM - [id number] Implanted       41mm Patella Implanted               Findings: djd    Indications: Colby Hunter is an 79 y.o. male who is having surgery for Primary osteoarthritis of both knees [M17.0].     The patient was seen in the preoperative area. The risks, benefits, complications, treatment options, non-operative alternatives, expected recovery and outcomes were discussed with the patient. The possibilities of reaction to medication, pulmonary aspiration, injury to surrounding structures, bleeding, recurrent infection, the need for additional procedures, failure to diagnose a condition, and creating a complication requiring transfusion or operation were discussed with the patient. The patient concurred with the proposed plan, giving informed consent.  The site of surgery was properly noted/marked if necessary per policy. The patient has been actively warmed in preoperative area. Preoperative antibiotics have been ordered and given within 1 hours of incision. Venous thrombosis prophylaxis have been ordered including unilateral  sequential compression device    Procedure Details: Patient was given an adductor canal block in the holding area and transferred the operating room.  A preoperative huddle was performed patient identification procedure and site verification.  Patient was given a spinal anesthetic.  He was dosed with preoperative antibiotics and TXA.  He was then positioned in the supine position.  The right leg was then sterilely prepped and draped in usual fashion.  A thigh tourniquet was inflated and midline median parapatellar retinacular incision was made.  Patella was everted.  Anterior synovectomy was performed.  Extensive medial release was performed for this varus knee.  Fat pad was excised.  The iPrint posterior stabilized system was utilized.  Femur was cut based on a 10 mm distal femoral cut in 5 degrees of valgus the tibia is cut using external alignment jig.  The patella was cut freehand.  Ultimately a size 8 femur size 9 tibia 5 mm posterior stabilized poly component was placed in a 41 mm patella button.  All components were cemented in place.  Components were balanced in flexion extension.  Patella tracked nicely.  Prior to closure the wound was locally infiltrated with a combination of Leonarda solution and irrigant the wound was closed in layers the skin was closed with  Circular suture.  Sterile dressing applied.  Evidence of Infection: No   Complications:  None; patient tolerated the procedure well.    Disposition: PACU - hemodynamically stable.  Condition: stable                 Additional Details:     Attending Attestation: I was present for the entire procedure.    Lan Hannah  Phone Number: [phone]

## 2025-06-11 ENCOUNTER — APPOINTMENT (OUTPATIENT)
Dept: HOME HEALTH SERVICES | Facility: HOME HEALTH | Age: 80
End: 2025-06-11
Payer: MEDICARE

## 2025-06-11 PROCEDURE — 1090000001 HH PPS REVENUE CREDIT

## 2025-06-11 PROCEDURE — 1090000002 HH PPS REVENUE DEBIT

## 2025-06-12 PROCEDURE — 1090000002 HH PPS REVENUE DEBIT

## 2025-06-12 PROCEDURE — 1090000001 HH PPS REVENUE CREDIT

## 2025-06-13 ENCOUNTER — TELEPHONE (OUTPATIENT)
Dept: ORTHOPEDIC SURGERY | Facility: CLINIC | Age: 80
End: 2025-06-13

## 2025-06-13 ENCOUNTER — HOME CARE VISIT (OUTPATIENT)
Dept: HOME HEALTH SERVICES | Facility: HOME HEALTH | Age: 80
End: 2025-06-13
Payer: MEDICARE

## 2025-06-13 VITALS
OXYGEN SATURATION: 96 % | RESPIRATION RATE: 16 BRPM | TEMPERATURE: 98.6 F | HEART RATE: 78 BPM | DIASTOLIC BLOOD PRESSURE: 72 MMHG | SYSTOLIC BLOOD PRESSURE: 108 MMHG

## 2025-06-13 DIAGNOSIS — M17.0 PRIMARY OSTEOARTHRITIS OF BOTH KNEES: Primary | ICD-10-CM

## 2025-06-13 PROCEDURE — G0180 MD CERTIFICATION HHA PATIENT: HCPCS | Performed by: ORTHOPAEDIC SURGERY

## 2025-06-13 PROCEDURE — 1090000001 HH PPS REVENUE CREDIT

## 2025-06-13 PROCEDURE — 1090000002 HH PPS REVENUE DEBIT

## 2025-06-13 PROCEDURE — G0151 HHCP-SERV OF PT,EA 15 MIN: HCPCS | Mod: HHH

## 2025-06-13 RX ORDER — OXYCODONE HYDROCHLORIDE 5 MG/1
5 TABLET ORAL EVERY 6 HOURS PRN
Qty: 28 TABLET | Refills: 0 | Status: SHIPPED | OUTPATIENT
Start: 2025-06-13 | End: 2025-06-20

## 2025-06-13 SDOH — HEALTH STABILITY: PHYSICAL HEALTH
EXERCISE COMMENTS: R LE PROTOCOL EXER.S IN SUPINE AND SITTING POS., 10 TO 15 REPS X 1 SET EA EXER. WITH INSTR. FOR PROPER PACE AND FULL AVAILABLE ROM FOR EACH EXERCISE.

## 2025-06-13 ASSESSMENT — ENCOUNTER SYMPTOMS
PAIN LOCATION: RIGHT KNEE
PAIN: 1
PAIN SEVERITY GOAL: 3/10
HIGHEST PAIN SEVERITY IN PAST 24 HOURS: 6/10
SUBJECTIVE PAIN PROGRESSION: GRADUALLY IMPROVING
OCCASIONAL FEELINGS OF UNSTEADINESS: 0
LOWEST PAIN SEVERITY IN PAST 24 HOURS: 3/10
PERSON REPORTING PAIN: PATIENT
MUSCLE WEAKNESS: 1

## 2025-06-13 ASSESSMENT — ACTIVITIES OF DAILY LIVING (ADL)
PHYSICAL_TRANSFERS_DEVICES: WHEELED WALKER
CURRENT_FUNCTION: ONE PERSON
AMBULATION ASSISTANCE: ONE PERSON
CURRENT_FUNCTION: STAND BY ASSIST
AMBULATION ASSISTANCE: STAND BY ASSIST
AMBULATION ASSISTANCE ON FLAT SURFACES: 1
PHYSICAL TRANSFERS ASSESSED: 1
AMBULATION ASSISTANCE: 1
AMBULATION_DISTANCE/DURATION_TOLERATED: 120 FEET X 2

## 2025-06-14 PROCEDURE — 1090000002 HH PPS REVENUE DEBIT

## 2025-06-14 PROCEDURE — 1090000001 HH PPS REVENUE CREDIT

## 2025-06-17 ENCOUNTER — HOME CARE VISIT (OUTPATIENT)
Dept: HOME HEALTH SERVICES | Facility: HOME HEALTH | Age: 80
End: 2025-06-17
Payer: MEDICARE

## 2025-06-17 VITALS
OXYGEN SATURATION: 96 % | DIASTOLIC BLOOD PRESSURE: 72 MMHG | TEMPERATURE: 97.6 F | SYSTOLIC BLOOD PRESSURE: 108 MMHG | RESPIRATION RATE: 18 BRPM | HEART RATE: 72 BPM

## 2025-06-17 PROCEDURE — G0151 HHCP-SERV OF PT,EA 15 MIN: HCPCS | Mod: HHH

## 2025-06-17 SDOH — HEALTH STABILITY: PHYSICAL HEALTH
EXERCISE COMMENTS: R LE TKA PROTOCOL EXER.S IN SUPINE, SITTING AND STANDING POS.,10 TO 15 REPS X 1 SET EA EXER. WITH INSTR. FOR PROPER PACE AND FULL AVAILABLE ROM FOR EACH EXERCISE.

## 2025-06-17 ASSESSMENT — ENCOUNTER SYMPTOMS
PAIN: 1
PAIN LOCATION: RIGHT KNEE
HIGHEST PAIN SEVERITY IN PAST 24 HOURS: 5/10
MUSCLE WEAKNESS: 1
PERSON REPORTING PAIN: PATIENT
LIMITED RANGE OF MOTION: 1
OCCASIONAL FEELINGS OF UNSTEADINESS: 1
SUBJECTIVE PAIN PROGRESSION: GRADUALLY IMPROVING
LOWEST PAIN SEVERITY IN PAST 24 HOURS: 1/10
PAIN SEVERITY GOAL: 3/10

## 2025-06-17 ASSESSMENT — ACTIVITIES OF DAILY LIVING (ADL)
CURRENT_FUNCTION: SUPERVISION
AMBULATION ASSISTANCE: SUPERVISION
PHYSICAL_TRANSFERS_DEVICES: WHEELED WALKER
AMBULATION ASSISTANCE: 1
AMBULATION ASSISTANCE: ONE PERSON
CURRENT_FUNCTION: ONE PERSON
PHYSICAL TRANSFERS ASSESSED: 1
AMBULATION_DISTANCE/DURATION_TOLERATED: 175 FEET X 2
AMBULATION ASSISTANCE ON FLAT SURFACES: 1

## 2025-06-20 ENCOUNTER — HOME CARE VISIT (OUTPATIENT)
Dept: HOME HEALTH SERVICES | Facility: HOME HEALTH | Age: 80
End: 2025-06-20
Payer: MEDICARE

## 2025-06-20 VITALS
DIASTOLIC BLOOD PRESSURE: 70 MMHG | TEMPERATURE: 98.2 F | RESPIRATION RATE: 18 BRPM | HEART RATE: 72 BPM | OXYGEN SATURATION: 96 % | SYSTOLIC BLOOD PRESSURE: 116 MMHG

## 2025-06-20 PROCEDURE — G0151 HHCP-SERV OF PT,EA 15 MIN: HCPCS | Mod: HHH

## 2025-06-20 ASSESSMENT — ACTIVITIES OF DAILY LIVING (ADL)
HOME_HEALTH_OASIS: 00
OASIS_M1830: 01

## 2025-06-20 ASSESSMENT — ENCOUNTER SYMPTOMS
HIGHEST PAIN SEVERITY IN PAST 24 HOURS: 4/10
PAIN LOCATION: RIGHT KNEE
PERSON REPORTING PAIN: PATIENT
OCCASIONAL FEELINGS OF UNSTEADINESS: 0
SUBJECTIVE PAIN PROGRESSION: GRADUALLY IMPROVING
PAIN SEVERITY GOAL: 3/10
LOWEST PAIN SEVERITY IN PAST 24 HOURS: 1/10
PAIN: 1

## 2025-06-23 ENCOUNTER — OFFICE VISIT (OUTPATIENT)
Dept: ORTHOPEDIC SURGERY | Facility: CLINIC | Age: 80
End: 2025-06-23
Payer: MEDICARE

## 2025-06-23 ENCOUNTER — HOSPITAL ENCOUNTER (OUTPATIENT)
Dept: RADIOLOGY | Facility: HOSPITAL | Age: 80
Discharge: HOME | End: 2025-06-23
Payer: MEDICARE

## 2025-06-23 ENCOUNTER — HOSPITAL ENCOUNTER (OUTPATIENT)
Dept: RADIOLOGY | Facility: CLINIC | Age: 80
Discharge: HOME | End: 2025-06-23
Payer: MEDICARE

## 2025-06-23 DIAGNOSIS — Z96.651 S/P TOTAL KNEE ARTHROPLASTY, RIGHT: ICD-10-CM

## 2025-06-23 DIAGNOSIS — Z86.718 HISTORY OF DVT (DEEP VEIN THROMBOSIS): ICD-10-CM

## 2025-06-23 PROCEDURE — 93971 EXTREMITY STUDY: CPT | Performed by: RADIOLOGY

## 2025-06-23 PROCEDURE — 1036F TOBACCO NON-USER: CPT | Performed by: ORTHOPAEDIC SURGERY

## 2025-06-23 PROCEDURE — 1159F MED LIST DOCD IN RCRD: CPT | Performed by: ORTHOPAEDIC SURGERY

## 2025-06-23 PROCEDURE — 73562 X-RAY EXAM OF KNEE 3: CPT | Mod: RIGHT SIDE | Performed by: RADIOLOGY

## 2025-06-23 PROCEDURE — 73562 X-RAY EXAM OF KNEE 3: CPT | Mod: RT

## 2025-06-23 PROCEDURE — 99024 POSTOP FOLLOW-UP VISIT: CPT | Performed by: ORTHOPAEDIC SURGERY

## 2025-06-23 PROCEDURE — 93971 EXTREMITY STUDY: CPT

## 2025-06-23 RX ORDER — OXYCODONE HYDROCHLORIDE 5 MG/1
5 TABLET ORAL EVERY 6 HOURS PRN
Qty: 28 TABLET | Refills: 0 | Status: SHIPPED | OUTPATIENT
Start: 2025-06-23 | End: 2025-06-30

## 2025-06-23 NOTE — PROGRESS NOTES
2 weeks status post total knee arthroplasty generally doing well but his developed some redness and some swelling in the lower leg he has outpatient scheduled for therapy  No fevers or chills  On examination incisions healing nicely has some redness of the lower leg with significant calf swelling compared to contralateral leg range of motion is is excellent 0-90 or 100  X-rays show implant in good position assessment persistent swelling will check duplex today exam is inconsistent with infection  Will renew pain medication follow-up in 4 weeks or sooner if needed

## 2025-06-23 NOTE — PROGRESS NOTES
Physical Therapy  Physical Therapy Orthopedic Evaluation    Patient Name: Colby Hunter 79 y.o.  MRN: [id number]  Today's Date: 6/24/2025  Time Calculation  Start Time: 0825  Stop Time: 0905  Time Calculation (min): 40 min  Reason for visit: Right TKR  (6/9/25)  Referring MD: Lan Hannah   Insurance: MEDICARE A&B/ MED NEC /100% COVERAGE/ ANTHEM SUPP NO AUTH REQ   DX: acute right knee pain , aftercare TKR  POC 1-2/week 6-8 weeks   Certification Period Start Date: 06/24/25  Certification Period End Date: 09/22/25      Current Problem  1. Acute pain of right knee  Follow Up In Physical Therapy      2. Aftercare following right knee joint replacement surgery  Referral to Physical Therapy    Follow Up In Physical Therapy          General:  General  Reason for Referral: Right TKR 6/9/25  Referred By: Lan Hannah  Precautions:  Precautions  STEADI Fall Risk Score (The score of 4 or more indicates an increased risk of falling): 4  Precautions Comment: 6/9/25 Right TKR, falls  Pain:       Subjective:     Subjective   Chief Complaint: Right knee pain following surgery.  2/10 current.  Difficult walking long distances with cane, balance, walking on uneven surfaces.  Prior was not using  a.d   Onset: 6/9/25  ADRIAN: TKR surgery     Current Condition: improving    PAIN  Intensity (0-10): 2/10  Description: tightness, stiffness    Aggravating Factors:  long walks   Relieving Factors:  ice machine     Relevant Information (PMH & Previous Tests/Imaging):   Previous Interventions/Treatments: home therapy,     Prior Level of Function (PLOF)  Exercise/Physical Activity: was daily   Work/School:Retired   semi retired   Living situation/Environment: lives with spouse     Treatment Goals: walk better w/o cane, better ROM     Red Flags: Do you have any of the following? No   Fever/chills, unexplained weight changes, dizziness/fainting, unexplained change in bowel or bladder functions, unexplained malaise or muscle weakness, night  pain/sweats, numbness or tingling  Medical history reviewed:  yes (scanned in chart)    Objective:  Objective       Observation/Posture: edema 8 cm at calf, 5 cm at knee R>L , appears as possible cellulitis, and patient has history of cellulitis.  Wife plans to contact MD today.  He was in yesterday for DVT scan that was negative.    Gait: st [place]ne[employer] inc AARON, dec TKE, dec stride, dec push off, dec heel strike    Transfers:  sit <> stand: moderate difficulty and upper extremity assist needed   sup <> sit: min difficulty  bed mobility: min difficulty      Knee ROM        Left knee: Ext= -5 Flex= 123  Right knee: Ext= -11 Flex= 107    Knee Strength        Extension: L= 5/5 [] R= NT/5  Flexion: L= 5/5 [] R= NT/5     SLR - mild quad lag       Outcome Measures:  Other Measures  Lower Extremity Funtional Score (LEFS): 20     EDUCATION: home exercise program, plan of care, activity modifications, pain management, and injury pathology       Goals:  Active       PT Problem       PT Goal 1       Start:  06/24/25    Expected End:  07/29/25       Patient will demonstrate good understanding and compliance with HEP   Knee AROM 0-120  SLR w/o quad lag          PT Goal 2       Start:  06/24/25    Expected End:  08/19/25       Knee strength 5/5 to improve gait and functional mobility   Decrease pain to  0-1/10 with normal activity   Will ascend/descend stairs with normalized pattern   Will ambulate safely with normalized pattern all distances and surfaces w/o a.d   Increase LEFS score by 9 points              Treatments:   Access Code: [email or web address]  URL: [email or web address]  Date: 06/24/2025  Prepared by: Ellis Alonzo    Exercises  - Quad Setting and Stretching  - 2-3 x daily - 10-20 reps - 5 hold  - Supine Heel Slide with Strap  - 2-3 x daily - 3-4 reps - 20-30 hold  - Long Sitting Calf Stretch with Strap  - 2-3 x daily - 1 sets - 3-4 reps - 30 hold  - Seated Table Hamstring Stretch  - 2-3 x daily - 1 sets - 3-4  reps - 30 hold  - Small Range Straight Leg Raise  - 1-2 x daily - 2-3 sets - 10 reps  - Sidelying Hip Abduction  - 1-2 x daily - 2-3 sets - 10 reps  - Seated Long Arc Quad  - 1-2 x daily - 2-3 sets - 10 reps    Assessment: Patient presents with signs and symptoms consistent with s/p right TKR, resulting in limited participation in pain-free ADLs and inability to perform at their prior level of function. Pt would benefit from physical therapy to address the impairments found & listed previously in the objective section in order to return to safe and pain-free ADLs and prior level of function.    Some concern due to significant swelling in Right LE.  Patient saw MD yesterday and had DVT scan which was negative.  Wife plans to contact MD today due to concerns for cellulitis.       Pain, Impaired balance, Impaired gait, Impaired stair negotiation , Impaired transfers, Decreased flexibility, Decreased strength, Limitations to normal ADL's, Fall risk , and Decreased knowledge of HEP     Clinical presentation:  Stable   Level of Complexity low     Plan:   Certification Period Start Date: 06/24/25  Certification Period End Date: 09/22/25  Rehab Potential: Good  Plan of Care Agreement: Patient  Planned Interventions include: therapeutic exercise, self-care home management, manual therapy, therapeutic activities, gait training, neuromuscular coordination, aquatic therapy, modalities PRN  Frequency: 1-2 /week   Duration: 6-8 weeks    Ellis Alonzo, PT

## 2025-06-24 ENCOUNTER — TELEPHONE (OUTPATIENT)
Dept: ORTHOPEDIC SURGERY | Facility: CLINIC | Age: 80
End: 2025-06-24

## 2025-06-24 ENCOUNTER — EVALUATION (OUTPATIENT)
Dept: PHYSICAL THERAPY | Facility: CLINIC | Age: 80
End: 2025-06-24
Payer: MEDICARE

## 2025-06-24 DIAGNOSIS — Z96.651 AFTERCARE FOLLOWING RIGHT KNEE JOINT REPLACEMENT SURGERY: ICD-10-CM

## 2025-06-24 DIAGNOSIS — M25.561 ACUTE PAIN OF RIGHT KNEE: Primary | ICD-10-CM

## 2025-06-24 DIAGNOSIS — Z96.651 S/P TOTAL KNEE ARTHROPLASTY, RIGHT: Primary | ICD-10-CM

## 2025-06-24 DIAGNOSIS — Z47.1 AFTERCARE FOLLOWING RIGHT KNEE JOINT REPLACEMENT SURGERY: ICD-10-CM

## 2025-06-24 PROCEDURE — 97161 PT EVAL LOW COMPLEX 20 MIN: CPT | Mod: GP | Performed by: PHYSICAL THERAPIST

## 2025-06-24 PROCEDURE — 97110 THERAPEUTIC EXERCISES: CPT | Mod: GP | Performed by: PHYSICAL THERAPIST

## 2025-06-24 RX ORDER — CEFADROXIL 500 MG/1
500 CAPSULE ORAL 2 TIMES DAILY
Qty: 20 CAPSULE | Refills: 0 | Status: SHIPPED | OUTPATIENT
Start: 2025-06-24

## 2025-06-24 NOTE — TELEPHONE ENCOUNTER
Patient would like to know if you could prescribe an antibiotic for cellulitis; stated pt's knee is red and swollen and has history of cellulitis.

## 2025-07-07 ENCOUNTER — TREATMENT (OUTPATIENT)
Dept: PHYSICAL THERAPY | Facility: CLINIC | Age: 80
End: 2025-07-07
Payer: MEDICARE

## 2025-07-07 ENCOUNTER — APPOINTMENT (OUTPATIENT)
Dept: ORTHOPEDIC SURGERY | Facility: CLINIC | Age: 80
End: 2025-07-07
Payer: MEDICARE

## 2025-07-07 DIAGNOSIS — Z96.651 S/P TOTAL KNEE ARTHROPLASTY, RIGHT: Primary | ICD-10-CM

## 2025-07-07 DIAGNOSIS — Z96.651 AFTERCARE FOLLOWING RIGHT KNEE JOINT REPLACEMENT SURGERY: ICD-10-CM

## 2025-07-07 DIAGNOSIS — M25.561 ACUTE PAIN OF RIGHT KNEE: Primary | ICD-10-CM

## 2025-07-07 DIAGNOSIS — Z47.1 AFTERCARE FOLLOWING RIGHT KNEE JOINT REPLACEMENT SURGERY: ICD-10-CM

## 2025-07-07 PROCEDURE — 1036F TOBACCO NON-USER: CPT | Performed by: ORTHOPAEDIC SURGERY

## 2025-07-07 PROCEDURE — 97110 THERAPEUTIC EXERCISES: CPT | Mod: GP,CQ

## 2025-07-07 PROCEDURE — 1159F MED LIST DOCD IN RCRD: CPT | Performed by: ORTHOPAEDIC SURGERY

## 2025-07-07 PROCEDURE — 99024 POSTOP FOLLOW-UP VISIT: CPT | Performed by: ORTHOPAEDIC SURGERY

## 2025-07-07 ASSESSMENT — PAIN - FUNCTIONAL ASSESSMENT: PAIN_FUNCTIONAL_ASSESSMENT: 0-10

## 2025-07-07 ASSESSMENT — PAIN SCALES - GENERAL: PAINLEVEL_OUTOF10: 2

## 2025-07-07 NOTE — PROGRESS NOTES
"Physical Therapy  Physical Therapy Treatment    Patient Name: Colby Hunter  MRN: [id number]  Today's Date: 7/7/2025    Time Calculation  Start Time: 0920  Stop Time: 1000  Time Calculation (min): 40 min    Insurance:  Visit number:  2 out of [place]   Authorization information: no auth  Insurance Type: MEDICARE A&B/ MED NEC /100% COVERAGE/ ANTHEM SUPP NO AUTH REQ   Certification Period Start Date: 06/24/25  Certification Period End Date: 09/22/25    General:  Reason for visit:  Right TKR 6/9/25   Referred by: Lan Hannah     Current Problem:   1. Acute pain of right knee        2. Aftercare following right knee joint replacement surgery          SUBJECTIVE:   Patient reports that they are doing stairs reciprocally now and that they have been driving for about a week.      Precautions:   Precautions  STEADI Fall Risk Score (The score of 4 or more indicates an increased risk of falling): 4  Precautions Comment: 6/9/25 Right TKR, falls     Pain:   Pain Assessment  Pain Assessment: 0-10  0-10 (Numeric) Pain Score: 2    OBJECTIVE:    Knee flex 116 standing on step   Knee ext lacking 10 degrees of extension      Treatments:  Therapeutic Exercise: ( 40 minutes)  Recumbent bike 5' lvl 3   Gastroc stretch 1'  Knee flexion stretch on steps 10\" x 2  HS stretch 1'   Leg press 3x10 100#   HS curls 3x10 35#   Squat taps on plinth 3x10   Bridges 3x10   Heel slides x20  Quad sets on foam roll x5  LLLD 5' 10#        HEP: [email or web address]  URL: [email or web address]  Date: 06/24/2025  Prepared by: Ellis Alonzo     Exercises  - Quad Setting and Stretching  - 2-3 x daily - 10-20 reps - 5 hold  - Supine Heel Slide with Strap  - 2-3 x daily - 3-4 reps - 20-30 hold  - Long Sitting Calf Stretch with Strap  - 2-3 x daily - 1 sets - 3-4 reps - 30 hold  - Seated Table Hamstring Stretch  - 2-3 x daily - 1 sets - 3-4 reps - 30 hold  - Small Range Straight Leg Raise  - 1-2 x daily - 2-3 sets - 10 reps  - Sidelying Hip Abduction "  - 1-2 x daily - 2-3 sets - 10 reps  - Seated Long Arc Quad  - 1-2 x daily - 2-3 sets - 10 reps      ASSESSMENT:   Pt tolerated treatment session well reporting no increase in pain throughout treatment session. Pt could tolerate an increase in resistance with leg press.    Charges:   Therapeutic Exercise:  3 unit    PLAN:     Continue to progress LE strengthening per patient tolerance and knee flex/ext ROM

## 2025-07-07 NOTE — PROGRESS NOTES
Follow-up total knee arthroplasty is about a month out now he has had issues with cellulitis prior to his knee replacement appear to have some cellulitis postoperatively he was put on antibiotics and it has cleared up he really has minimal pain has done very well with physical therapy incision looks nicely healed he has some redness no drainage and no large effusion excellent mobility range of motion's 0-115 easily assessment clinically doing well and watch his skin condition carefully do not want to get secondarily infected so keep an eye on it come back and follow-up in 3 to 4 weeks he is to continue outpatient therapy

## 2025-07-08 ENCOUNTER — TELEPHONE (OUTPATIENT)
Dept: ORTHOPEDIC SURGERY | Facility: CLINIC | Age: 80
End: 2025-07-08
Payer: MEDICARE

## 2025-07-08 DIAGNOSIS — Z96.651 S/P TOTAL KNEE ARTHROPLASTY, RIGHT: Primary | ICD-10-CM

## 2025-07-08 RX ORDER — OXYCODONE HYDROCHLORIDE 5 MG/1
5 TABLET ORAL EVERY 6 HOURS PRN
Qty: 28 TABLET | Refills: 0 | Status: SHIPPED | OUTPATIENT
Start: 2025-07-08 | End: 2025-07-15

## 2025-07-08 NOTE — TELEPHONE ENCOUNTER
Patient's wife stated that her  was seen in the office on 07/07/2025. The patient was told that the Oxycodone would be refilled. Mrs. Hunter is asking for the Oxycodone to be sent to the [facility] is [place] if possible.

## 2025-07-10 ENCOUNTER — TREATMENT (OUTPATIENT)
Dept: PHYSICAL THERAPY | Facility: CLINIC | Age: 80
End: 2025-07-10
Payer: MEDICARE

## 2025-07-10 DIAGNOSIS — Z47.1 AFTERCARE FOLLOWING RIGHT KNEE JOINT REPLACEMENT SURGERY: ICD-10-CM

## 2025-07-10 DIAGNOSIS — Z96.651 AFTERCARE FOLLOWING RIGHT KNEE JOINT REPLACEMENT SURGERY: ICD-10-CM

## 2025-07-10 DIAGNOSIS — M25.561 ACUTE PAIN OF RIGHT KNEE: Primary | ICD-10-CM

## 2025-07-10 PROCEDURE — 97112 NEUROMUSCULAR REEDUCATION: CPT | Mod: GP,CQ

## 2025-07-10 PROCEDURE — 97110 THERAPEUTIC EXERCISES: CPT | Mod: GP,CQ

## 2025-07-10 PROCEDURE — 97016 VASOPNEUMATIC DEVICE THERAPY: CPT | Mod: GP,CQ

## 2025-07-10 ASSESSMENT — PAIN - FUNCTIONAL ASSESSMENT: PAIN_FUNCTIONAL_ASSESSMENT: 0-10

## 2025-07-10 ASSESSMENT — PAIN SCALES - GENERAL: PAINLEVEL_OUTOF10: 1

## 2025-07-10 NOTE — PROGRESS NOTES
Physical Therapy  Physical Therapy Treatment    Patient Name: Colby Hunter  MRN: [id number]  Today's Date: 7/10/2025  Time Calculation  Start Time: 0835  Stop Time: 0930  Time Calculation (min): 55 min    Insurance:  Visit number: 3 of [place]  Authorization info: NAN  Insurance Type: Medicare A&B / 100% Coverage / ANTHEM SUPP NO AUTH REQ   Cert date start: 6/24/25   Cert date end: 9/22/25             Onset date: 6/9/25    General   Reason for Referral: Right TKR 6/9/25   Referred By: Lan Hannah MD    Current Problem  1. Acute pain of right knee        2. Aftercare following right knee joint replacement surgery            Precautions  STEADI Fall Risk Score (The score of 4 or more indicates an increased risk of falling): 4  Precautions Comment: 6/9/25 Right TKR, falls    Pain Assessment: 0-10  0-10 (Numeric) Pain Score: 1    Subjective:   Patient reports that he has been doing more walking and able to do reciprocal steps now.  Pain is maybe a 1.   HEP Performed:  Yes    Objective:   Rt. Knee ROM- Pre °  Post 5- 116°  Swelling 3.0cm R>L    Treatments:   Bike (seat #6)- 4'  DBE 2x1.5'  Bosu lung alternating- 1.5'  KB 4kg tandem stand R/L, L/R cw/ccw- 1' ea dir.  Hamstring curl 45# 1x20  Leg press 130# 1x20  Hip ab/ad 40#/55# 2x15 ea.  Agility ladder- high knees, side step 40' ea.  Heel slides- 2'  GR Rt. Knee (med)- 15' (with short 1/2 roll under calf for knee extension)    Charges: TE 2, NME 1  Access code: [email or web address]    Assessment:   Doing very well.  Needs to ice more at home to control his knee swelling.    Plan:   Continue decreasing right knee discomfort increasing rom, flexibility, mobility, balance, strength, endurance and function for return to pain free ADL.    Naren Rowland, PTA

## 2025-07-15 ENCOUNTER — TREATMENT (OUTPATIENT)
Dept: PHYSICAL THERAPY | Facility: CLINIC | Age: 80
End: 2025-07-15
Payer: MEDICARE

## 2025-07-15 DIAGNOSIS — Z96.651 AFTERCARE FOLLOWING RIGHT KNEE JOINT REPLACEMENT SURGERY: ICD-10-CM

## 2025-07-15 DIAGNOSIS — Z47.1 AFTERCARE FOLLOWING RIGHT KNEE JOINT REPLACEMENT SURGERY: ICD-10-CM

## 2025-07-15 DIAGNOSIS — M25.561 ACUTE PAIN OF RIGHT KNEE: Primary | ICD-10-CM

## 2025-07-15 PROCEDURE — 97112 NEUROMUSCULAR REEDUCATION: CPT | Mod: GP,CQ

## 2025-07-15 PROCEDURE — 97016 VASOPNEUMATIC DEVICE THERAPY: CPT | Mod: GP,CQ

## 2025-07-15 PROCEDURE — 97110 THERAPEUTIC EXERCISES: CPT | Mod: GP,CQ

## 2025-07-15 ASSESSMENT — PAIN - FUNCTIONAL ASSESSMENT: PAIN_FUNCTIONAL_ASSESSMENT: 0-10

## 2025-07-15 ASSESSMENT — PAIN SCALES - GENERAL: PAINLEVEL_OUTOF10: 2

## 2025-07-15 NOTE — PROGRESS NOTES
Physical Therapy  Physical Therapy Treatment    Patient Name: Colby Hunter  MRN: [id number]  Today's Date: 7/15/2025  Time Calculation  Start Time: 1335  Stop Time: 1430  Time Calculation (min): 55 min    Insurance:  Visit number: 4 of [place]  Authorization info: NAN  Insurance Type: Medicare A&B / 100% Coverage / ANTHEM SUPP NO AUTH REQ   Cert date start: 6/24/25   Cert date end: 9/22/25             Onset date: 6/9/25    General   Reason for Referral: Right TKR 6/9/25   Referred By: Lan Hannah MD    Current Problem  1. Acute pain of right knee        2. Aftercare following right knee joint replacement surgery            Precautions  STEADI Fall Risk Score (The score of 4 or more indicates an increased risk of falling): 4  Precautions Comment: 6/9/25 Right TKR, falls    Pain Assessment: 0-10  0-10 (Numeric) Pain Score: 2    Subjective:   Patient reports that his knee is still swollen but he has very little discomfort.  Pain is maybe a 2 today.  HEP Performed:  Yes    Objective:   Rt. Knee ROM- Pre °  Post °  Swelling 4.0cm R>L    Treatments:   T5 NuStep (seat #13)- 4'  DBE 2x1.5'  Bosu lung alternating- 1.5'  KB 6kg tandem stand R/L, L/R cw/ccw- 1' ea dir.  Hamstring curl 45# 1x25  Leg press 130# 1x25  Hip ab/ad 40#/55# 2x15 ea.  Agility ladder- high knees, side step 40' ea.  Heel slides- 2'  GR Rt. Knee (med)- 15' (with short 1/2 roll under calf for knee extension)    Charges: TE 2, NME 1  Access code: [email or web address]    Assessment:   Doing very well.  Needs to ice more at home to control his knee swelling.    Plan:   Continue decreasing right knee discomfort increasing rom, flexibility, mobility, balance, strength, endurance and function for return to pain free ADL.    Naren Rowland, PTA

## 2025-07-17 ENCOUNTER — TREATMENT (OUTPATIENT)
Dept: PHYSICAL THERAPY | Facility: CLINIC | Age: 80
End: 2025-07-17
Payer: MEDICARE

## 2025-07-17 DIAGNOSIS — Z47.1 AFTERCARE FOLLOWING RIGHT KNEE JOINT REPLACEMENT SURGERY: ICD-10-CM

## 2025-07-17 DIAGNOSIS — M25.561 ACUTE PAIN OF RIGHT KNEE: Primary | ICD-10-CM

## 2025-07-17 DIAGNOSIS — Z96.651 AFTERCARE FOLLOWING RIGHT KNEE JOINT REPLACEMENT SURGERY: ICD-10-CM

## 2025-07-17 PROCEDURE — 97112 NEUROMUSCULAR REEDUCATION: CPT | Mod: GP,CQ

## 2025-07-17 PROCEDURE — 97016 VASOPNEUMATIC DEVICE THERAPY: CPT | Mod: GP,CQ

## 2025-07-17 PROCEDURE — 97110 THERAPEUTIC EXERCISES: CPT | Mod: GP,CQ

## 2025-07-17 ASSESSMENT — PAIN - FUNCTIONAL ASSESSMENT: PAIN_FUNCTIONAL_ASSESSMENT: 0-10

## 2025-07-17 NOTE — PROGRESS NOTES
Physical Therapy  Physical Therapy Treatment    Patient Name: Colby Hunter  MRN: [id number]  Today's Date: 7/17/2025  Time Calculation  Start Time: 1005  Stop Time: 1100  Time Calculation (min): 55 min    Insurance:  Visit number: 5 of [place]  Authorization info: NAN  Insurance Type: Medicare A&B / 100% Coverage / ANTHEM SUPP NO AUTH REQ   Cert date start: 6/24/25   Cert date end: 9/22/25             Onset date: 6/9/25    General   Reason for Referral: Right TKR 6/9/25   Referred By: Lan Hannah MD    Current Problem  1. Acute pain of right knee        2. Aftercare following right knee joint replacement surgery            Precautions  STEADI Fall Risk Score (The score of 4 or more indicates an increased risk of falling): 4  Precautions Comment: 6/9/25 Right TKR, falls    Pain Assessment: 0-10    Subjective:   Patient reports that his knee is still swollen but he has very little discomfort.  Pain is maybe a 2 today.  HEP Performed:  Yes    Objective:   Rt. Knee ROM- Pre 8-115°  Post 8-125°  Swelling 4.0cm R>L    Treatments:   (Bike (seat #5)- 4'  DBE 2x1.5'  Bosu lung alternating- 1.5'  KB 6kg tandem stand R/L, L/R cw/ccw- 1' ea dir.  Hamstring curl 35# 1x30  Leg press 130# 1x30  Hip ab/ad 40#/55# 2x17 ea.  Agility ladder- high knees, side step 40' ea.  Heel slides- 2'  GR Rt. Knee (med)- 15' (with short 1/2 roll under calf for knee extension)    Charges: TE 2, NME 1, Vaso  Access code: [email or web address]    Assessment:   Main concern is the knee swelling.  Otherwise progressing well.    Plan:   Continue decreasing right knee discomfort increasing rom, flexibility, mobility, balance, strength, endurance and function for return to pain free ADL.    Naren Rowland, PTA

## 2025-07-21 ENCOUNTER — DOCUMENTATION (OUTPATIENT)
Dept: PHYSICAL THERAPY | Facility: CLINIC | Age: 80
End: 2025-07-21
Payer: MEDICARE

## 2025-07-21 ENCOUNTER — APPOINTMENT (OUTPATIENT)
Dept: PHYSICAL THERAPY | Facility: CLINIC | Age: 80
End: 2025-07-21
Payer: MEDICARE

## 2025-07-21 DIAGNOSIS — Z47.1 AFTERCARE FOLLOWING RIGHT KNEE JOINT REPLACEMENT SURGERY: ICD-10-CM

## 2025-07-21 DIAGNOSIS — Z96.651 AFTERCARE FOLLOWING RIGHT KNEE JOINT REPLACEMENT SURGERY: ICD-10-CM

## 2025-07-21 DIAGNOSIS — M25.561 ACUTE PAIN OF RIGHT KNEE: Primary | ICD-10-CM

## 2025-07-21 NOTE — PROGRESS NOTES
Therapy Communication Note    Patient Name: Colby Hunter  MRN: [id number]  Department:   Room: Room/bed info not found  Today's Date: 7/21/2025     Discipline: Physical Therapy        Missed Time: Cancel    Comment: Patient realized at 9:15, that he had a 9:15 appointment.  Called but was unable to get through.  I called patient and he informed of this, decided to just wait until Wednesday for his next scheduled visit.

## 2025-07-23 ENCOUNTER — TREATMENT (OUTPATIENT)
Dept: PHYSICAL THERAPY | Facility: CLINIC | Age: 80
End: 2025-07-23
Payer: MEDICARE

## 2025-07-23 DIAGNOSIS — Z47.1 AFTERCARE FOLLOWING RIGHT KNEE JOINT REPLACEMENT SURGERY: ICD-10-CM

## 2025-07-23 DIAGNOSIS — Z96.651 AFTERCARE FOLLOWING RIGHT KNEE JOINT REPLACEMENT SURGERY: ICD-10-CM

## 2025-07-23 DIAGNOSIS — M25.561 ACUTE PAIN OF RIGHT KNEE: Primary | ICD-10-CM

## 2025-07-23 PROCEDURE — 97110 THERAPEUTIC EXERCISES: CPT | Mod: GP | Performed by: PHYSICAL THERAPIST

## 2025-07-23 PROCEDURE — 97016 VASOPNEUMATIC DEVICE THERAPY: CPT | Mod: GP | Performed by: PHYSICAL THERAPIST

## 2025-07-23 NOTE — PROGRESS NOTES
"Physical Therapy  Physical Therapy Treatment    Patient Name: Colby Hunter  MRN: [id number]  Today's Date: 7/23/2025  Time Calculation  Start Time: 0915  Stop Time: 1010  Time Calculation (min): 55 min    Insurance:  Visit number: 5 of [place]  Authorization info: NAN  Insurance Type: Medicare A&B / 100% Coverage / ANTHEM SUPP NO AUTH REQ   Cert date start: 6/24/25   Cert date end: 9/22/25             Onset date: 6/9/25    General   Reason for Referral: Right TKR 6/9/25   Referred By: Lan Hannah MD    Current Problem  1. Acute pain of right knee  Follow Up In Physical Therapy      2. Aftercare following right knee joint replacement surgery  Follow Up In Physical Therapy                    Subjective:   Patient reports 2/10 pain current, and feels that therapy is helping.  I do have steps at home and I have been doing them normally.    HEP Performed:  Yes    Objective:   Rt. Knee ROM- Pre 9-117°   (121 post      Treatments:   Stepper L3 x5'  DBE 2x2'  Sit to stand 2x10 (Added to HEP)    Step ups 6\" x20   Step downs 6\" x20   KB 6kg tandem stand R/L, L/R cw/ccw- 1' ea dir. -not performed    Heel slide 30\" 4  Prone knee ext stretch 30\" x4 (Added to HEP)    Manual knee extension and patellar mobs   Hamstring curl 40# 2x10 -not performed    Leg press 150# 2x10  Hip ab/ad 40#/55# 2x17 ea.    GR Rt. Knee (med)- 15' (with short 1/2 roll under calf for knee extension)    Charges: TE 3, Vaso    - PT Therapeutic Procedures Time Entry  Therapeutic Exercise Time Entry: 40 - PT Modalities Time Entry  Vasopneumatic Devices Time Entry: 15   Access code: [email or web address]    Assessment:   Continues to have swelling right LE.  Flexion Rom doing well overall, but stil lacking extension.  Additions to HEP to address and will reassess changes next session.       Plan:   Continue decreasing right knee discomfort increasing rom, flexibility, mobility, balance, strength, endurance and function for return to pain free ADL. Consider LLLD extension " stretch NV     Ellis Alonzo, PT

## 2025-07-28 ENCOUNTER — TREATMENT (OUTPATIENT)
Dept: PHYSICAL THERAPY | Facility: CLINIC | Age: 80
End: 2025-07-28
Payer: MEDICARE

## 2025-07-28 ENCOUNTER — OFFICE VISIT (OUTPATIENT)
Dept: ORTHOPEDIC SURGERY | Facility: CLINIC | Age: 80
End: 2025-07-28
Payer: MEDICARE

## 2025-07-28 DIAGNOSIS — Z96.651 S/P TOTAL KNEE ARTHROPLASTY, RIGHT: Primary | ICD-10-CM

## 2025-07-28 DIAGNOSIS — Z96.651 AFTERCARE FOLLOWING RIGHT KNEE JOINT REPLACEMENT SURGERY: ICD-10-CM

## 2025-07-28 DIAGNOSIS — M25.561 ACUTE PAIN OF RIGHT KNEE: Primary | ICD-10-CM

## 2025-07-28 DIAGNOSIS — Z47.1 AFTERCARE FOLLOWING RIGHT KNEE JOINT REPLACEMENT SURGERY: ICD-10-CM

## 2025-07-28 PROCEDURE — 97110 THERAPEUTIC EXERCISES: CPT | Mod: GP | Performed by: PHYSICAL THERAPIST

## 2025-07-28 PROCEDURE — 99024 POSTOP FOLLOW-UP VISIT: CPT | Performed by: ORTHOPAEDIC SURGERY

## 2025-07-28 PROCEDURE — 1159F MED LIST DOCD IN RCRD: CPT | Performed by: ORTHOPAEDIC SURGERY

## 2025-07-28 PROCEDURE — 99212 OFFICE O/P EST SF 10 MIN: CPT | Performed by: ORTHOPAEDIC SURGERY

## 2025-07-28 PROCEDURE — 97016 VASOPNEUMATIC DEVICE THERAPY: CPT | Mod: GP | Performed by: PHYSICAL THERAPIST

## 2025-07-28 NOTE — PROGRESS NOTES
"Physical Therapy  Physical Therapy Treatment    Patient Name: Colby Hunter  MRN: [id number]  Today's Date: 7/28/2025  Time Calculation  Start Time: 1045  Stop Time: 1140  Time Calculation (min): 55 min    Insurance:  Visit number: [place]  Authorization info: NAN  Insurance Type: Medicare A&B / 100% Coverage / [employer] SUPP NO AUTH REQ   Cert date start: 6/24/25   Cert date end: 9/22/25             Onset date: 6/9/25    General   Reason for Referral: Right TKR 6/9/25   Referred By: Lan Hannah MD    Current Problem  1. Acute pain of right knee  Follow Up In Physical Therapy      2. Aftercare following right knee joint replacement surgery  Follow Up In Physical Therapy                    Subjective:   Patient reports 2/10 pain current.  Reports compliant with Hep and thinks the new exercise helped with the knee straightening.    HEP Performed:  Yes    Objective:   Rt. Knee ROM- Pre °   (-5 after stretching)  Swelling 4cm at knee joint R>L       Treatments:   Stepper L3 x5'  DBE 2x2'  Sit to stand 2x10     Step ups fwd/lat 8\" x20   Step downs 6\" x20   KB 6kg tandem stand R/L, L/R cw/ccw- 1' ea dir. -not performed    Heel slide 30\" 4 - in HEP    Prone knee ext stretch 30\" x4    Manual knee extension and patellar mobs   Hamstring curl 40# 2x12    Leg press 150# 2x12  Hip ab/ad 55# 2x15 ea.    GR Rt. Knee (med)- 15'     Charges: TE 3, Vaso    - PT Therapeutic Procedures Time Entry  Therapeutic Exercise Time Entry: 40 - PT Modalities Time Entry  Vasopneumatic Devices Time Entry: 15   Access code: [email or web address]    Assessment:   Knee flexion ROM progressing since last session but extension remains limited.  Did show good improvement in extension range after prone knee stretch and would benefit from performing more frequently at home.  Patient verbalized understanding.       Plan:   Continue decreasing right knee discomfort increasing rom, flexibility, mobility, balance, strength, endurance and function for return to " pain free ADL. Consider LLLD extension stretch NV     Ellis Alonzo, PT

## 2025-07-29 RX ORDER — TRAMADOL HYDROCHLORIDE 50 MG/1
50 TABLET, FILM COATED ORAL EVERY 8 HOURS PRN
Qty: 28 TABLET | Refills: 0 | Status: SHIPPED | OUTPATIENT
Start: 2025-07-29 | End: 2025-08-05

## 2025-07-30 ENCOUNTER — TREATMENT (OUTPATIENT)
Dept: PHYSICAL THERAPY | Facility: CLINIC | Age: 80
End: 2025-07-30
Payer: MEDICARE

## 2025-07-30 DIAGNOSIS — M25.561 ACUTE PAIN OF RIGHT KNEE: Primary | ICD-10-CM

## 2025-07-30 DIAGNOSIS — Z96.651 AFTERCARE FOLLOWING RIGHT KNEE JOINT REPLACEMENT SURGERY: ICD-10-CM

## 2025-07-30 DIAGNOSIS — Z47.1 AFTERCARE FOLLOWING RIGHT KNEE JOINT REPLACEMENT SURGERY: ICD-10-CM

## 2025-07-30 PROCEDURE — 97016 VASOPNEUMATIC DEVICE THERAPY: CPT | Mod: GP | Performed by: PHYSICAL THERAPIST

## 2025-07-30 PROCEDURE — 97110 THERAPEUTIC EXERCISES: CPT | Mod: GP | Performed by: PHYSICAL THERAPIST

## 2025-07-30 NOTE — PROGRESS NOTES
"Physical Therapy  Physical Therapy Treatment    Patient Name: Colby Hunter  MRN: [id number]  Today's Date: 7/30/2025  Time Calculation  Start Time: 1045  Stop Time: 1123  Time Calculation (min): 38 min    Insurance:  Visit number: 7 of [place]  Authorization info: NAN  Insurance Type: Medicare A&B / 100% Coverage / [employer] SUPP NO AUTH REQ   Cert date start: 6/24/25   Cert date end: 9/22/25             Onset date: 6/9/25    General   Reason for Referral: Right TKR 6/9/25   Referred By: Lan Hannah MD    Current Problem  1. Acute pain of right knee  Follow Up In Physical Therapy      2. Aftercare following right knee joint replacement surgery  Follow Up In Physical Therapy                    Subjective:   Patient reports 2/10 pain current.   My wife massaged my leg.  This morning it was down in swelling, but now it looks bigger.  I have not worn compression stockings.  MD said I was doing pretty good.  I don't need to see him until next June.  HEP Performed:  Yes    Objective:   Rt. Knee ROM- Pre 8-122°   (-5 after stretching)  Swelling 4.5 cm at knee joint R>L       Treatments:   Stepper L3 x5'  Prone knee ext stretch 30\" x4   DBE 2x2'  Sit to stand 2x10   -not performed    Step ups fwd/lat 8\" x20     Step downs 6\" x20 -not performed       Hamstring curl 40# 2x12  -not performed    Leg press 150# 2x12 -not performed    Hip ab/ad 55# 2x15 ea. -not performed      GR Rt. Knee (med)- 10'     Charges: TE 2, Vaso    - PT Therapeutic Procedures Time Entry  Therapeutic Exercise Time Entry: 28 - PT Modalities Time Entry  Vasopneumatic Devices Time Entry: 10   Access code: [email or web address]    Assessment:   Patient reported not \"feeling it\" today and wanted to shorten session.  Discussed elevating leg and using compression stocking to reduce swelling.       Plan:   Continue decreasing right knee discomfort increasing rom, flexibility, mobility, balance, strength, endurance and function for return to pain free ADL. Consider LLLD " extension stretch NV     Ellis Alonzo, PT

## (undated) DEVICE — SUTURE, CTD, VICRYL, 2-0, UND, BR, CT-2

## (undated) DEVICE — DRESSING, MEPILEX BORDER, POST-OP AG, 4 X 10 IN

## (undated) DEVICE — CAUTERY, PENCIL, PUSH BUTTON, SMOKE EVAC, 70MM

## (undated) DEVICE — SUTURE, STRATAFIX, 1 PDO CTX 36 X 36CM

## (undated) DEVICE — SYRINGE, 50 CC, LUER LOCK

## (undated) DEVICE — SUTURE, VICRYL, 1, 27 IN, CT-1, VIOLET

## (undated) DEVICE — TOWEL, SURGICAL, NEURO, O/R, 16 X 26, BLUE, STERILE

## (undated) DEVICE — BLADE, SAW, RECIPROCATING, DOUBLE-SIDED, 70 X 12.5 X 1 MM, STAINLESS STEEL

## (undated) DEVICE — CUFF, TOURNIQUET, 30 X 4, DUAL PORT/SNGL BLADDER, DISP, LF

## (undated) DEVICE — CLOSURE SYSTEM, DERMABOND, PRINEO, 22CM, STERILE

## (undated) DEVICE — Device

## (undated) DEVICE — SUTURE, MONOCRYL, 3-0, 18 IN, PS2, UNDYED

## (undated) DEVICE — BLADE, SAW, DUAL SAGITTAL, 1.9 X 90 X 30

## (undated) DEVICE — GLOVE, SURGICAL, PROTEXIS PI ORTHO, 8.0, PF, LF

## (undated) DEVICE — GLOVE, SURGICAL, PROTEXIS PI MICRO, 8.0, PF, LF